# Patient Record
Sex: FEMALE | Race: WHITE | Employment: OTHER | URBAN - METROPOLITAN AREA
[De-identification: names, ages, dates, MRNs, and addresses within clinical notes are randomized per-mention and may not be internally consistent; named-entity substitution may affect disease eponyms.]

---

## 2017-02-13 ENCOUNTER — TELEPHONE (OUTPATIENT)
Dept: INTERNAL MEDICINE CLINIC | Facility: CLINIC | Age: 77
End: 2017-02-13

## 2017-02-15 NOTE — TELEPHONE ENCOUNTER
Pharmacy called in to follow up on refill request.  Pharmacy states pt is completely out of medication.

## 2017-02-17 RX ORDER — SIMVASTATIN 20 MG
TABLET ORAL
Qty: 90 TABLET | Refills: 0 | Status: SHIPPED | OUTPATIENT
Start: 2017-02-17 | End: 2017-05-25

## 2017-02-17 NOTE — TELEPHONE ENCOUNTER
Cholesterol Medications  Protocol Criteria:  · Appointment scheduled in the past 12 months or in the next 3 months  · ALT & LDL on file in the past 12 months  · ALT result < 80  · LDL result <130   Recent Visits       Provider Department Primary Dx    6 mo

## 2017-02-21 RX ORDER — LISINOPRIL 20 MG/1
20 TABLET ORAL
Qty: 90 TABLET | Refills: 0 | OUTPATIENT
Start: 2017-02-21

## 2017-02-27 RX ORDER — HYDROCHLOROTHIAZIDE 25 MG/1
25 TABLET ORAL
Qty: 90 TABLET | Refills: 1 | OUTPATIENT
Start: 2017-02-27

## 2017-03-02 NOTE — TELEPHONE ENCOUNTER
Per pt, he made an appt this Saturday 03/04/2017 and would like to know if Dr Nichole Bustos can refill his med, pt is totally out of med.

## 2017-03-04 ENCOUNTER — OFFICE VISIT (OUTPATIENT)
Dept: INTERNAL MEDICINE CLINIC | Facility: CLINIC | Age: 77
End: 2017-03-04

## 2017-03-04 ENCOUNTER — APPOINTMENT (OUTPATIENT)
Dept: LAB | Age: 77
End: 2017-03-04
Attending: INTERNAL MEDICINE
Payer: MEDICARE

## 2017-03-04 VITALS
DIASTOLIC BLOOD PRESSURE: 71 MMHG | SYSTOLIC BLOOD PRESSURE: 103 MMHG | HEART RATE: 76 BPM | HEIGHT: 60 IN | WEIGHT: 166 LBS | BODY MASS INDEX: 32.59 KG/M2

## 2017-03-04 DIAGNOSIS — I10 ESSENTIAL HYPERTENSION, BENIGN: ICD-10-CM

## 2017-03-04 DIAGNOSIS — E78.00 PURE HYPERCHOLESTEROLEMIA: Primary | ICD-10-CM

## 2017-03-04 DIAGNOSIS — E78.00 PURE HYPERCHOLESTEROLEMIA: ICD-10-CM

## 2017-03-04 LAB
ALBUMIN SERPL BCP-MCNC: 3.7 G/DL (ref 3.5–4.8)
ALBUMIN/GLOB SERPL: 1.1 {RATIO} (ref 1–2)
ALP SERPL-CCNC: 77 U/L (ref 32–100)
ALT SERPL-CCNC: 19 U/L (ref 14–54)
ANION GAP SERPL CALC-SCNC: 7 MMOL/L (ref 0–18)
AST SERPL-CCNC: 25 U/L (ref 15–41)
BILIRUB SERPL-MCNC: 0.6 MG/DL (ref 0.3–1.2)
BUN SERPL-MCNC: 20 MG/DL (ref 8–20)
BUN/CREAT SERPL: 20.6 (ref 10–20)
CALCIUM SERPL-MCNC: 9.7 MG/DL (ref 8.5–10.5)
CHLORIDE SERPL-SCNC: 106 MMOL/L (ref 95–110)
CHOLEST SERPL-MCNC: 140 MG/DL (ref 110–200)
CO2 SERPL-SCNC: 30 MMOL/L (ref 22–32)
CREAT SERPL-MCNC: 0.97 MG/DL (ref 0.5–1.5)
GLOBULIN PLAS-MCNC: 3.3 G/DL (ref 2.5–3.7)
GLUCOSE SERPL-MCNC: 90 MG/DL (ref 70–99)
HDLC SERPL-MCNC: 59 MG/DL
LDLC SERPL CALC-MCNC: 67 MG/DL (ref 0–99)
NONHDLC SERPL-MCNC: 81 MG/DL
OSMOLALITY UR CALC.SUM OF ELEC: 298 MOSM/KG (ref 275–295)
POTASSIUM SERPL-SCNC: 3.5 MMOL/L (ref 3.3–5.1)
PROT SERPL-MCNC: 7 G/DL (ref 5.9–8.4)
SODIUM SERPL-SCNC: 143 MMOL/L (ref 136–144)
TRIGL SERPL-MCNC: 70 MG/DL (ref 1–149)

## 2017-03-04 PROCEDURE — 99214 OFFICE O/P EST MOD 30 MIN: CPT | Performed by: INTERNAL MEDICINE

## 2017-03-04 PROCEDURE — 36415 COLL VENOUS BLD VENIPUNCTURE: CPT

## 2017-03-04 PROCEDURE — 80053 COMPREHEN METABOLIC PANEL: CPT

## 2017-03-04 PROCEDURE — G0463 HOSPITAL OUTPT CLINIC VISIT: HCPCS | Performed by: INTERNAL MEDICINE

## 2017-03-04 PROCEDURE — 80061 LIPID PANEL: CPT

## 2017-03-04 NOTE — PROGRESS NOTES
HPI:    Patient ID: Antonio Townsend is a 77year old female. Pt presents to the clinic for a HTN and HLD management evaluation. Denies CP or SOB. Hypertension  This is a chronic problem. The current episode started more than 1 year ago.  The problem i Dementia Mother      Alzheimers   • Cancer Mother 80   • Diabetes Brother    • Heart Disease Brother      CAD   • Hypertension Brother    • Obesity Brother         Smoking Status: Never Smoker                      Alcohol Use: Yes           0.0 oz/week Height: 5' (1.524 m)   Weight: 166 lb (75.297 kg)         Body mass index is 32.42 kg/(m^2).     Diabetes:  No results found for: A1C, HGBA1C, EAG    Lab Results  Component Value Date   CHOLEST 157 07/18/2016   CHOLEST 150 05/01/2015   CHOLEST 194 12/10/2 Stew Triana, 3/4/2017, 10:46 AM.      IRIMMA MD,  personally performed the services described in this documentation. All medical record entries made by the scribe were at my direction and in my presence.   I have reviewed the chart an

## 2017-05-12 ENCOUNTER — OFFICE VISIT (OUTPATIENT)
Dept: INTERNAL MEDICINE CLINIC | Facility: CLINIC | Age: 77
End: 2017-05-12

## 2017-05-12 ENCOUNTER — APPOINTMENT (OUTPATIENT)
Dept: LAB | Age: 77
End: 2017-05-12
Attending: INTERNAL MEDICINE
Payer: MEDICARE

## 2017-05-12 VITALS
HEART RATE: 73 BPM | WEIGHT: 168.31 LBS | SYSTOLIC BLOOD PRESSURE: 124 MMHG | DIASTOLIC BLOOD PRESSURE: 78 MMHG | BODY MASS INDEX: 33 KG/M2

## 2017-05-12 DIAGNOSIS — G25.0 BENIGN ESSENTIAL TREMOR: Primary | ICD-10-CM

## 2017-05-12 DIAGNOSIS — G25.0 BENIGN ESSENTIAL TREMOR: ICD-10-CM

## 2017-05-12 PROCEDURE — 36415 COLL VENOUS BLD VENIPUNCTURE: CPT

## 2017-05-12 PROCEDURE — G0463 HOSPITAL OUTPT CLINIC VISIT: HCPCS | Performed by: INTERNAL MEDICINE

## 2017-05-12 PROCEDURE — 99213 OFFICE O/P EST LOW 20 MIN: CPT | Performed by: INTERNAL MEDICINE

## 2017-05-12 PROCEDURE — 84443 ASSAY THYROID STIM HORMONE: CPT

## 2017-05-12 NOTE — PROGRESS NOTES
HPI:    Patient ID: Lydia Burgess is a 77year old female. HPI Comments: Patient's  particularly has noted fine tremor in her hands minimally. She is having several days for several months. It does not happen all the time.   She does not drop f Tab Take 5 mg by mouth daily. Disp:  Rfl:    DiphenhydrAMINE HCl, Sleep, (SIMPLY SLEEP OR) Take  by mouth. Prn for sleep. Disp:  Rfl:    aspirin 81 MG Oral Chew Tab Chew 81 mg by mouth daily.  Disp:  Rfl:    FLUoxetine HCl (PROZAC) 20 MG Oral Cap Take 20 mg

## 2017-05-23 ENCOUNTER — TELEPHONE (OUTPATIENT)
Dept: INTERNAL MEDICINE CLINIC | Facility: CLINIC | Age: 77
End: 2017-05-23

## 2017-05-25 RX ORDER — LISINOPRIL 20 MG/1
20 TABLET ORAL
Qty: 90 TABLET | Refills: 0 | Status: SHIPPED | OUTPATIENT
Start: 2017-05-25 | End: 2017-08-31

## 2017-05-25 RX ORDER — SIMVASTATIN 20 MG
TABLET ORAL
Qty: 90 TABLET | Refills: 0 | Status: SHIPPED | OUTPATIENT
Start: 2017-05-25 | End: 2017-09-20

## 2017-05-25 RX ORDER — HYDROCHLOROTHIAZIDE 25 MG/1
25 TABLET ORAL
Qty: 90 TABLET | Refills: 0 | Status: SHIPPED | OUTPATIENT
Start: 2017-05-25 | End: 2017-11-21

## 2017-05-25 NOTE — TELEPHONE ENCOUNTER
Pt's spouse is calling.  Pt needs a refill on:      Current outpatient prescriptions:   •  simvastatin 20 MG Oral Tab, TAKE ONE TABLET BY MOUTH NIGHTLY, Disp: 90 tablet, Rfl: 0    •  lisinopril 20 MG Oral Tab, Take 1 tablet (20 mg total) by mouth once daily

## 2017-05-25 NOTE — TELEPHONE ENCOUNTER
Refill protocol criteria met, rx sent.       Hypertensive Medications  Protocol Criteria:  · Appointment scheduled in the past 6 months or in the next 3 months  · BMP or CMP in the past 12 months  · Creatinine result < 2  Recent Visits       Provider Depart

## 2017-06-23 ENCOUNTER — TELEPHONE (OUTPATIENT)
Dept: INTERNAL MEDICINE CLINIC | Facility: CLINIC | Age: 77
End: 2017-06-23

## 2017-06-23 NOTE — TELEPHONE ENCOUNTER
Spouse calling regarding office visit with pt in may. Spouse state that Dr. Bermudez Flatten discuss medication for the tremours that the pt is having. At the time pt declined medication  But now pt is considering getting the medication. .. please advise

## 2017-06-24 RX ORDER — PROPRANOLOL HYDROCHLORIDE 10 MG/1
10 TABLET ORAL DAILY
Qty: 90 TABLET | Refills: 0 | Status: SHIPPED | OUTPATIENT
Start: 2017-06-24 | End: 2017-09-21

## 2017-06-24 NOTE — TELEPHONE ENCOUNTER
Rx sent. Patient was left a general message a new rx was sent. Patient to call office if any further questions.     Can transfer to [phone]

## 2017-07-12 ENCOUNTER — APPOINTMENT (OUTPATIENT)
Dept: LAB | Facility: HOSPITAL | Age: 77
End: 2017-07-12
Attending: UROLOGY
Payer: MEDICARE

## 2017-07-12 ENCOUNTER — OFFICE VISIT (OUTPATIENT)
Dept: SURGERY | Facility: CLINIC | Age: 77
End: 2017-07-12

## 2017-07-12 VITALS
SYSTOLIC BLOOD PRESSURE: 111 MMHG | TEMPERATURE: 98 F | DIASTOLIC BLOOD PRESSURE: 75 MMHG | BODY MASS INDEX: 30.78 KG/M2 | WEIGHT: 163 LBS | HEIGHT: 61 IN | HEART RATE: 63 BPM

## 2017-07-12 DIAGNOSIS — N39.3 SUI (STRESS URINARY INCONTINENCE, FEMALE): ICD-10-CM

## 2017-07-12 DIAGNOSIS — N39.3 SUI (STRESS URINARY INCONTINENCE, FEMALE): Primary | ICD-10-CM

## 2017-07-12 LAB
BILIRUB UR QL: NEGATIVE
COLOR UR: YELLOW
GLUCOSE UR-MCNC: NEGATIVE MG/DL
HYALINE CASTS #/AREA URNS AUTO: 12 /LPF
KETONES UR-MCNC: NEGATIVE MG/DL
NITRITE UR QL STRIP.AUTO: NEGATIVE
PH UR: 5 [PH] (ref 5–8)
PROT UR-MCNC: 30 MG/DL
RBC #/AREA URNS AUTO: 3 /HPF
SP GR UR STRIP: 1.02 (ref 1–1.03)
UROBILINOGEN UR STRIP-ACNC: <2
VIT C UR-MCNC: NEGATIVE MG/DL
WBC #/AREA URNS AUTO: 12 /HPF

## 2017-07-12 PROCEDURE — G0463 HOSPITAL OUTPT CLINIC VISIT: HCPCS | Performed by: UROLOGY

## 2017-07-12 PROCEDURE — 99204 OFFICE O/P NEW MOD 45 MIN: CPT | Performed by: UROLOGY

## 2017-07-12 PROCEDURE — 81001 URINALYSIS AUTO W/SCOPE: CPT

## 2017-07-12 NOTE — PROGRESS NOTES
SUBJECTIVE:  Gibson Marie is a 77year old female who presents for a consultation at the request of, and a copy of this note will be sent to, Dr. Femi Perry, for evaluation of  Stress urinary incontinence.   She states 6 weeks ago had a bad cold with chronic co pleurisy. CARDIOVASCULAR:  Negative for pain or chest discomfort, dizziness or fainting, palpitations, leg swelling, nocturia, or claudication.   GASTROINTESTINAL:  Negative for nausea, vomiting, diarrhea, constipation, heartburn or indigestion, abdominal

## 2017-08-01 ENCOUNTER — TELEPHONE (OUTPATIENT)
Dept: SURGERY | Facility: CLINIC | Age: 77
End: 2017-08-01

## 2017-08-01 NOTE — TELEPHONE ENCOUNTER
Phoned pt and received voice mail. Lmtcb, stating Genevieve Cecily has some information and instructions for her. Clinic call back number provided.

## 2017-08-01 NOTE — TELEPHONE ENCOUNTER
----- Message from Beatrice Silverio MD sent at 7/28/2017 10:00 PM CDT -----  Staff please call the patient's  as the patient has some issues with dementia.   Inform him that her urinalysis from 2 weeks ago did demonstrate abnormal levels of blood and w

## 2017-08-03 ENCOUNTER — APPOINTMENT (OUTPATIENT)
Dept: LAB | Age: 77
End: 2017-08-03
Attending: UROLOGY
Payer: MEDICARE

## 2017-08-03 DIAGNOSIS — R31.29 MICROHEMATURIA: ICD-10-CM

## 2017-08-03 LAB
BILIRUB UR QL: NEGATIVE
COLOR UR: YELLOW
GLUCOSE UR-MCNC: NEGATIVE MG/DL
KETONES UR-MCNC: NEGATIVE MG/DL
NITRITE UR QL STRIP.AUTO: NEGATIVE
PH UR: 7 [PH] (ref 5–8)
PROT UR-MCNC: NEGATIVE MG/DL
RBC #/AREA URNS AUTO: 2 /HPF
SP GR UR STRIP: 1.02 (ref 1–1.03)
UROBILINOGEN UR STRIP-ACNC: <2
VIT C UR-MCNC: NEGATIVE MG/DL
WBC #/AREA URNS AUTO: 6 /HPF

## 2017-08-03 PROCEDURE — 87086 URINE CULTURE/COLONY COUNT: CPT

## 2017-08-03 PROCEDURE — 81001 URINALYSIS AUTO W/SCOPE: CPT

## 2017-08-11 ENCOUNTER — TELEPHONE (OUTPATIENT)
Dept: INTERNAL MEDICINE CLINIC | Facility: CLINIC | Age: 77
End: 2017-08-11

## 2017-08-11 NOTE — TELEPHONE ENCOUNTER
Patient is eligible for Medicare Advantage AWV Physical Exam. Please assist patient in scheduling appointment when patient returns call. Thank you. Left message to call back [phone].

## 2017-08-16 ENCOUNTER — TELEPHONE (OUTPATIENT)
Dept: INTERNAL MEDICINE CLINIC | Facility: CLINIC | Age: 77
End: 2017-08-16

## 2017-08-23 ENCOUNTER — TELEPHONE (OUTPATIENT)
Dept: SURGERY | Facility: CLINIC | Age: 77
End: 2017-08-23

## 2017-08-23 NOTE — TELEPHONE ENCOUNTER
I was able to speak with Ana Rosado whom is [place]  and he was given results per Anson Quiroga.  was also told that his wife should be seen in 3 month to recheck urine and follow up on urinary symptoms.  Patients  stated that he would call gio

## 2017-08-24 ENCOUNTER — OFFICE VISIT (OUTPATIENT)
Dept: INTERNAL MEDICINE CLINIC | Facility: CLINIC | Age: 77
End: 2017-08-24

## 2017-08-24 VITALS
SYSTOLIC BLOOD PRESSURE: 106 MMHG | HEIGHT: 61 IN | HEART RATE: 71 BPM | WEIGHT: 161.19 LBS | DIASTOLIC BLOOD PRESSURE: 73 MMHG | BODY MASS INDEX: 30.43 KG/M2

## 2017-08-24 DIAGNOSIS — I10 ESSENTIAL HYPERTENSION, BENIGN: ICD-10-CM

## 2017-08-24 DIAGNOSIS — Z00.00 ROUTINE GENERAL MEDICAL EXAMINATION AT A HEALTH CARE FACILITY: Primary | ICD-10-CM

## 2017-08-24 DIAGNOSIS — E78.00 PURE HYPERCHOLESTEROLEMIA: ICD-10-CM

## 2017-08-24 DIAGNOSIS — R41.3 MEMORY LOSS: ICD-10-CM

## 2017-08-24 DIAGNOSIS — R26.9 ABNORMAL GAIT: ICD-10-CM

## 2017-08-24 PROCEDURE — 96160 PT-FOCUSED HLTH RISK ASSMT: CPT | Performed by: INTERNAL MEDICINE

## 2017-08-24 PROCEDURE — G0439 PPPS, SUBSEQ VISIT: HCPCS | Performed by: INTERNAL MEDICINE

## 2017-08-24 NOTE — PROGRESS NOTES
HPI:    Patient ID: Jazmín Dimas is a 77year old female. Annual Preventative Exam  [place] Kasper arrives today for annual preventative physical examination. The patient has 0/10 pain. She is taking her medication as prescribed.  The patient is having a new p walking and exertion. She has tried nothing for the symptoms. Review of Systems   Constitutional: Negative for chills and fever. Respiratory: Negative for chest tightness and shortness of breath. Cardiovascular: Negative for chest pain.    Agatha Grady DiphenhydrAMINE HCl, Sleep, (SIMPLY SLEEP OR) Take  by mouth. Prn for sleep. Disp:  Rfl:    aspirin 81 MG Oral Chew Tab Chew 81 mg by mouth daily. Disp:  Rfl:    FLUoxetine HCl (PROZAC) 20 MG Oral Cap Take 20 mg by mouth daily.  Disp:  Rfl:      Allergies Height: 5' 1\" (1.549 m)         Body mass index is 30.46 kg/m².     Cholesterol:       Lab Results  Component Value Date   CHOLEST 140 03/04/2017   CHOLEST 157 07/18/2016   CHOLEST 150 05/01/2015       Lab Results  Component Value Date   HDL 59 03/04/201 Call the office if symptoms worsen. Addendum  September 1, 2017  The patient is a fall risk that is what I referred her to physical therapy. Please see my plan above. Pro Simpson MD      [facility]     In the past six months, have you lost more th Depression Screening (PHQ-2/PHQ-9): Over the LAST 2 WEEKS   Little interest or pleasure in doing things (over the last two weeks)?: Not at all    Feeling down, depressed, or hopeless (over the last two weeks)?: Not at all    PHQ-2 SCORE: 0        Advan \"Ball\": Correct    Recall \"Flag\": Correct    Recall \"Tree\": Correct            Yuriy Perdomo's SCREENING SCHEDULE   Tests on this list are recommended by your physician but may not be covered, or covered at this frequency, by your insurer.  Please ch applicable    Pneumococcal No orders found for this or any previous visit. Update Immunization Activity if applicable    Hepatitis B No orders found for this or any previous visit.  Update Immunization Activity if applicable    Tetanus No orders found for t Ilya Fritz MD,  personally performed the services described in this documentation. All medical record entries made by the scribe were at my direction and in my presence.   I have reviewed the chart and discharge instructions (if applicable) and agree that the re

## 2017-08-29 ENCOUNTER — TELEPHONE (OUTPATIENT)
Dept: INTERNAL MEDICINE CLINIC | Facility: CLINIC | Age: 77
End: 2017-08-29

## 2017-08-29 NOTE — TELEPHONE ENCOUNTER
Pt following up on refill request for lisinopril 20 MG Oral Tab. .. please advise       Current Outpatient Prescriptions:  lisinopril 20 MG Oral Tab Take 1 tablet (20 mg total) by mouth once daily.  Disp: 90 tablet Rfl: 0

## 2017-08-31 RX ORDER — LISINOPRIL 20 MG/1
20 TABLET ORAL
Qty: 90 TABLET | Refills: 0 | Status: SHIPPED | OUTPATIENT
Start: 2017-08-31 | End: 2017-12-30

## 2017-08-31 NOTE — TELEPHONE ENCOUNTER
Requesting Lisinopril refill    Prescription refilled per IM/FM refill protocol, left VM informing pt.     Hypertensive Medications  Protocol Criteria:  · Appointment scheduled in the past 6 months or in the next 3 months  · BMP or CMP in the past 12 months 30 03/04/2017   GLOBULIN 3.3 03/04/2017   AGRATIO 1.2 07/18/2016   ANIONGAP 7 03/04/2017   OSMOCAL 298 (H) 03/04/2017

## 2017-09-06 ENCOUNTER — OFFICE VISIT (OUTPATIENT)
Dept: PHYSICAL THERAPY | Age: 77
End: 2017-09-06
Attending: INTERNAL MEDICINE
Payer: MEDICARE

## 2017-09-06 DIAGNOSIS — R26.9 ABNORMAL GAIT: ICD-10-CM

## 2017-09-06 PROCEDURE — 97162 PT EVAL MOD COMPLEX 30 MIN: CPT | Performed by: PHYSICAL THERAPIST

## 2017-09-06 NOTE — PROGRESS NOTES
LOWER EXTREMITY EVALUATION:   Referring Physician: Dr. Rashmi Connelly  Diagnosis: Abnormal gait (R26.9)  Date of Onset: 8/24/17 Date of Service: 9/6/2017     PATIENT Roxanne Núñez is a 77year old y/o female who presents to therapy today with complaints treatment, plan of care, mack balance test, test results, increasing safety awareness, and improving gait pattern. Patient was instructed in and issued HEP for: NOEMI over a fulcrum, posture correction in sitting.     Charges: PT Eval x1      Total Timed care.    X___________________________________________________ Date____________________    Certification From: 0/3/8903  To:12/5/2017

## 2017-09-08 ENCOUNTER — OFFICE VISIT (OUTPATIENT)
Dept: PHYSICAL THERAPY | Age: 77
End: 2017-09-08
Attending: INTERNAL MEDICINE
Payer: MEDICARE

## 2017-09-08 DIAGNOSIS — R26.9 ABNORMAL GAIT: ICD-10-CM

## 2017-09-08 PROCEDURE — 97110 THERAPEUTIC EXERCISES: CPT | Performed by: PHYSICAL THERAPIST

## 2017-09-08 NOTE — PROGRESS NOTES
Date: 9/8/2017     Dx: Abnormal gait (R26.9)         Authorized # of Visits:  8       Referring MD: Ilya Babcock MD visit: none scheduled    Medication Changes since last visit?: No    Subjective: Patient has no new complaints.   She is ambulatory without an

## 2017-09-13 ENCOUNTER — OFFICE VISIT (OUTPATIENT)
Dept: PHYSICAL THERAPY | Age: 77
End: 2017-09-13
Attending: INTERNAL MEDICINE
Payer: MEDICARE

## 2017-09-13 DIAGNOSIS — R26.9 ABNORMAL GAIT: ICD-10-CM

## 2017-09-13 PROCEDURE — 97110 THERAPEUTIC EXERCISES: CPT | Performed by: PHYSICAL THERAPIST

## 2017-09-13 NOTE — PROGRESS NOTES
Date: 9/13/2017     Dx:  Abnormal gait (R26.9)         Authorized # of Visits:  8       Referring MD: Ezekiel Babcock MD visit: none scheduled    Medication Changes since last visit?: No    Subjective: Patient is ambulating with a longer step length and slower g consistently have good posture to promote her symptomfree condition. • Therapy Goals               Plan:   Continue with strengthening, stability, ROM, balance, proprioception, stretching, and gait training. Charges:  TherEx x 3       Total Ti

## 2017-09-15 ENCOUNTER — OFFICE VISIT (OUTPATIENT)
Dept: PHYSICAL THERAPY | Age: 77
End: 2017-09-15
Attending: INTERNAL MEDICINE
Payer: MEDICARE

## 2017-09-15 DIAGNOSIS — R26.9 ABNORMAL GAIT: ICD-10-CM

## 2017-09-15 PROCEDURE — 97110 THERAPEUTIC EXERCISES: CPT | Performed by: PHYSICAL THERAPIST

## 2017-09-15 NOTE — PROGRESS NOTES
Date: 9/15/2017     Dx:  Abnormal gait (R26.9)         Authorized # of Visits:  8       Referring MD: Odalis Babcock MD visit: none scheduled    Medication Changes since last visit?: No    Subjective: Patient is ambulating with a longer step length and slower g after her exercises. She required moderate cueing to improve her step length, widen CLARI and to promote heel-toe and push-off pattern. Goals:   Goals     • Therapy Goals            1.  Patient to consistently perform her HEP and it's progression to

## 2017-09-20 ENCOUNTER — TELEPHONE (OUTPATIENT)
Dept: INTERNAL MEDICINE CLINIC | Facility: CLINIC | Age: 77
End: 2017-09-20

## 2017-09-20 ENCOUNTER — OFFICE VISIT (OUTPATIENT)
Dept: PHYSICAL THERAPY | Age: 77
End: 2017-09-20
Attending: INTERNAL MEDICINE
Payer: MEDICARE

## 2017-09-20 DIAGNOSIS — R26.9 ABNORMAL GAIT: ICD-10-CM

## 2017-09-20 PROCEDURE — 97110 THERAPEUTIC EXERCISES: CPT | Performed by: PHYSICAL THERAPIST

## 2017-09-20 RX ORDER — SIMVASTATIN 20 MG
TABLET ORAL
Qty: 90 TABLET | Refills: 0 | Status: SHIPPED | OUTPATIENT
Start: 2017-09-20 | End: 2017-12-21

## 2017-09-20 NOTE — PROGRESS NOTES
Date: 9/20/2017     Dx: Abnormal gait (R26.9)         Authorized # of Visits:  8       Referring MD: Jessenia Birmingham  Next MD visit: none scheduled    Medication Changes since last visit?: No    Subjective: Patient is feeling good without any complains of LOB.   She i Squat 6-5b x 20 reps ea    Shuttle: (B) Calf raises 6b 20+15 reps    (B) Calf stretch with inclined step L2 3 x 30 secs ea              Assessment:   Patient was dizzy after doing her leg press exercise and upon sitting up.  She remained dizzy for 10 mins a

## 2017-09-20 NOTE — TELEPHONE ENCOUNTER
Cholesterol Medications  Protocol Criteria: lov 8/24/17.  rx refilled per protocol  · Appointment scheduled in the past 12 months or in the next 3 months  · ALT & LDL on file in the past 12 months  · ALT result < 80  · LDL result <130   Recent Outpatient Vi

## 2017-09-21 ENCOUNTER — TELEPHONE (OUTPATIENT)
Dept: INTERNAL MEDICINE CLINIC | Facility: CLINIC | Age: 77
End: 2017-09-21

## 2017-09-21 RX ORDER — PROPRANOLOL HYDROCHLORIDE 10 MG/1
10 TABLET ORAL DAILY
Qty: 90 TABLET | Refills: 0 | Status: SHIPPED | OUTPATIENT
Start: 2017-09-21 | End: 2017-12-21

## 2017-09-21 NOTE — TELEPHONE ENCOUNTER
Signed Prescriptions Disp Refills    Propranolol HCl 10 MG Oral Tab 90 tablet 0      Sig: Take 1 tablet (10 mg total) by mouth daily. Authorizing Provider: Elmer Richards        Ordering User: Kaylin Wilcox           Refill approved per protocol. GFRNAA 56 (L) 03/04/2017   GFRAA >60 03/04/2017   CA 9.7 03/04/2017   ALKPHOS 73 07/18/2016   AST 25 03/04/2017   ALT 19 03/04/2017   BILT 0.6 03/04/2017   TP 7.0 03/04/2017   ALB 3.7 03/04/2017    03/04/2017   K 3.5 03/04/2017    03/04/2017

## 2017-09-22 ENCOUNTER — OFFICE VISIT (OUTPATIENT)
Dept: PHYSICAL THERAPY | Age: 77
End: 2017-09-22
Attending: INTERNAL MEDICINE
Payer: MEDICARE

## 2017-09-22 DIAGNOSIS — R26.9 ABNORMAL GAIT: ICD-10-CM

## 2017-09-22 PROCEDURE — 97110 THERAPEUTIC EXERCISES: CPT | Performed by: PHYSICAL THERAPIST

## 2017-09-22 NOTE — PROGRESS NOTES
Date: 9/22/2017     Dx: Abnormal gait (R26.9)         Authorized # of Visits:  8       Referring MD: Bard Eduardo Babcock MD visit: none scheduled    Medication Changes since last visit?: No    Subjective: Patient is feeling better today compared to last visit.   She ea    Seated: (R/L) hamstring curl greenTB 10 reps x 10 cts ea    Standing: (R/L) hip flexion redTB x 20 reps    Hydrant (R/L) 2 lb ball 4 x 15 cts ea    Monster walk redTB fwd/bkwd 3 laps x 15 feet    (R/L) SLStance opposite with CGA x 10 reps Recumbent b Therapy Goals               Plan:   Continue with strengthening, stability, ROM, balance, proprioception, stretching, and gait training. Charges:  TherEx x 3       Total Timed Treatment: 47 mins Total Treatment Time: 49 mins

## 2017-09-26 ENCOUNTER — APPOINTMENT (OUTPATIENT)
Dept: PHYSICAL THERAPY | Age: 77
End: 2017-09-26
Attending: INTERNAL MEDICINE
Payer: MEDICARE

## 2017-09-26 ENCOUNTER — TELEPHONE (OUTPATIENT)
Dept: PHYSICAL THERAPY | Age: 77
End: 2017-09-26

## 2017-09-28 ENCOUNTER — OFFICE VISIT (OUTPATIENT)
Dept: PHYSICAL THERAPY | Age: 77
End: 2017-09-28
Attending: INTERNAL MEDICINE
Payer: MEDICARE

## 2017-09-28 PROCEDURE — 97110 THERAPEUTIC EXERCISES: CPT | Performed by: PHYSICAL THERAPIST

## 2017-09-28 NOTE — PROGRESS NOTES
Date: 9/22/2017     Dx: Abnormal gait (R26.9)         Authorized # of Visits:  12       Referring MD: No ref. provider found  Next MD visit: none scheduled    Medication Changes since last visit?: No    Subjective: Patient reports that she is feeling good. reps ea    Shuttle: (B) Calf raises 6b 20+15 reps    (B) Calf stretch with inclined step L2 3 x 30 secs ea   Recumbent bike L3 x 6 mins    Shuttle: (B) Squat 7b 2 x 20 reps    Shuttle: (R/L) Squat 6b x 20+10 reps ea    Shuttle: (B) Calf raises 6b 20+15 rep

## 2017-09-29 ENCOUNTER — APPOINTMENT (OUTPATIENT)
Dept: PHYSICAL THERAPY | Age: 77
End: 2017-09-29
Attending: INTERNAL MEDICINE
Payer: MEDICARE

## 2017-10-03 ENCOUNTER — TELEPHONE (OUTPATIENT)
Dept: PHYSICAL THERAPY | Age: 77
End: 2017-10-03

## 2017-10-03 ENCOUNTER — APPOINTMENT (OUTPATIENT)
Dept: PHYSICAL THERAPY | Age: 77
End: 2017-10-03
Attending: INTERNAL MEDICINE
Payer: MEDICARE

## 2017-10-05 ENCOUNTER — OFFICE VISIT (OUTPATIENT)
Dept: PHYSICAL THERAPY | Age: 77
End: 2017-10-05
Attending: INTERNAL MEDICINE
Payer: MEDICARE

## 2017-10-05 NOTE — PROGRESS NOTES
Date: 10/5/2017     Dx:  Abnormal gait (R26.9)         Authorized # of Visits:  12       Referring MD: Sourav Babcock MD visit: none scheduled    Medication Changes since last visit?: No    Subjective: Patient and  came in today for her physical therapy Therapy Goals           Plan: Patient was instructed to contact MD and to get okay or clearance to continue with physical therapy due to fall/hit on head.

## 2017-10-06 ENCOUNTER — OFFICE VISIT (OUTPATIENT)
Dept: INTERNAL MEDICINE CLINIC | Facility: CLINIC | Age: 77
End: 2017-10-06

## 2017-10-06 VITALS
DIASTOLIC BLOOD PRESSURE: 68 MMHG | HEART RATE: 80 BPM | SYSTOLIC BLOOD PRESSURE: 97 MMHG | BODY MASS INDEX: 30 KG/M2 | WEIGHT: 157.69 LBS | TEMPERATURE: 98 F

## 2017-10-06 DIAGNOSIS — Z23 IMMUNIZATION DUE: ICD-10-CM

## 2017-10-06 DIAGNOSIS — S09.90XA HEAD TRAUMA, INITIAL ENCOUNTER: Primary | ICD-10-CM
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
## 2017-10-06 DIAGNOSIS — I10 ESSENTIAL HYPERTENSION, BENIGN: ICD-10-CM
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
## 2017-10-06 PROCEDURE — 99214 OFFICE O/P EST MOD 30 MIN: CPT | Performed by: INTERNAL MEDICINE

## 2017-10-06 PROCEDURE — 90653 IIV ADJUVANT VACCINE IM: CPT | Performed by: INTERNAL MEDICINE

## 2017-10-06 PROCEDURE — G0008 ADMIN INFLUENZA VIRUS VAC: HCPCS | Performed by: INTERNAL MEDICINE

## 2017-10-06 PROCEDURE — G0463 HOSPITAL OUTPT CLINIC VISIT: HCPCS | Performed by: INTERNAL MEDICINE

## 2017-10-06 NOTE — PROGRESS NOTES
HPI:    Patient ID: Otf Souza is a 77year old female. History provided by pt and . Fall   The accident occurred 5 to 7 days ago (10/2/17, night). The fall occurred while walking. She fell from a height of 6 to 10 ft.  Impact surface: entert Unspecified essential hypertension       Past Surgical History:  No date: FOOT SURGERY  No date:       Comment: x 3  No date: TONSILLECTOMY   Family History   Problem Relation Age of Onset   • Heart Disease Father      CAD   • Dementia Mother      Zoraida Sheridan gallop and no friction rub. No murmur heard. Pulmonary/Chest: Effort normal and breath sounds normal. No respiratory distress. She has no wheezes. She has no rales. She exhibits no tenderness. Musculoskeletal: She exhibits no edema.    Skin: Skin is d direction and in my presence. I have reviewed the chart and discharge instructions (if applicable) and agree that the record reflects my personal performance and is accurate and complete.   Imelda Cramer MD, 10/6/2017, 4:58 PM

## 2017-10-10 ENCOUNTER — TELEPHONE (OUTPATIENT)
Dept: PHYSICAL THERAPY | Age: 77
End: 2017-10-10

## 2017-10-10 ENCOUNTER — APPOINTMENT (OUTPATIENT)
Dept: PHYSICAL THERAPY | Age: 77
End: 2017-10-10
Attending: INTERNAL MEDICINE
Payer: MEDICARE

## 2017-10-12 ENCOUNTER — OFFICE VISIT (OUTPATIENT)
Dept: PHYSICAL THERAPY | Age: 77
End: 2017-10-12
Attending: INTERNAL MEDICINE
Payer: MEDICARE

## 2017-10-12 PROCEDURE — 97110 THERAPEUTIC EXERCISES: CPT | Performed by: PHYSICAL THERAPIST

## 2017-10-12 NOTE — PROGRESS NOTES
Date: 10/12/2017     Dx: Abnormal gait (R26.9)         Authorized # of Visits:  12       Referring MD: Kamar Doll  Next MD visit: none scheduled    Medication Changes since last visit?: No    Subjective: Patient reports that she is feeling good.   No weakness or ea    Shuttle: (B) Calf raises 6b 20+15 reps    (B) Calf stretch with inclined step L2 3 x 30 secs ea   Recumbent bike L3 x 6 mins    Shuttle: (B) Squat 7b 2 x 20 reps    Shuttle: (R/L) Squat 6b x 20+10 reps ea    Shuttle: (B) Calf raises 6b 20+15 reps cooking) activities without discomfort  4. Patient to consistently have good posture to promote her symptomfree condition.        • Therapy Goals               Plan:   Continue with strengthening, stability, ROM, balance, proprioception, stretching, and gai

## 2017-10-17 ENCOUNTER — OFFICE VISIT (OUTPATIENT)
Dept: PHYSICAL THERAPY | Age: 77
End: 2017-10-17
Attending: INTERNAL MEDICINE
Payer: MEDICARE

## 2017-10-17 PROCEDURE — 97110 THERAPEUTIC EXERCISES: CPT | Performed by: PHYSICAL THERAPIST

## 2017-10-17 NOTE — PROGRESS NOTES
Date: 10/12/2017     Dx: Abnormal gait (R26.9)         Authorized # of Visits:  12       Referring MD: Audrey Petit  Next MD visit: none scheduled    Medication Changes since last visit?: No    Subjective: Patient reports that she is feeling good.   No weakness or ea    Shuttle: (B) Calf raises 6b 20+15 reps    (B) Calf stretch with inclined step L2 3 x 30 secs ea   Recumbent bike L3 x 6 mins    Shuttle: (B) Squat 7b 2 x 20 reps    Shuttle: (R/L) Squat 6b x 20+10 reps ea    Shuttle: (B) Calf raises 6b 20+15 reps end of her session. Goals:   Goals     • Therapy Goals            1. Patient to consistently perform her HEP and it's progression to maintain her improved condition.    2. Patient to have reduced, centralized, and abolished symptoms in the low back to e

## 2017-10-26 ENCOUNTER — OFFICE VISIT (OUTPATIENT)
Dept: PHYSICAL THERAPY | Age: 77
End: 2017-10-26
Attending: INTERNAL MEDICINE
Payer: MEDICARE

## 2017-10-26 PROCEDURE — 97110 THERAPEUTIC EXERCISES: CPT | Performed by: PHYSICAL THERAPIST

## 2017-10-26 NOTE — PROGRESS NOTES
Date: 10/12/2017     Dx: Abnormal gait (R26.9)         Authorized # of Visits:  12       Referring MD: Dharmesh Babcock MD visit: none scheduled    Medication Changes since last visit?: No    Subjective: Patient reports that she is feeling good.   No weakness or ea    Shuttle: (B) Calf raises 6b 20+15 reps    (B) Calf stretch with inclined step L2 3 x 30 secs ea   Recumbent bike L3 x 6 mins    Shuttle: (B) Squat 7b 2 x 20 reps    Shuttle: (R/L) Squat 6b x 20+10 reps ea    Shuttle: (B) Calf raises 6b 20+15 reps 10 cts ea    (R/L) LE SLStance on bluefoam with opposite foot foamroll tap x 10 reps ea    Hydrant: (R/L) 4 lb ball 3 x 15 cts ea         Assessment:   Patient requires moderate cueing to keep her feet apart and maintain wide CLARI.  Patient still needs CGA d

## 2017-10-31 ENCOUNTER — OFFICE VISIT (OUTPATIENT)
Dept: PHYSICAL THERAPY | Age: 77
End: 2017-10-31
Attending: INTERNAL MEDICINE
Payer: MEDICARE

## 2017-10-31 PROCEDURE — 97110 THERAPEUTIC EXERCISES: CPT | Performed by: PHYSICAL THERAPIST

## 2017-10-31 NOTE — PROGRESS NOTES
Date: 10/12/2017     Dx: Abnormal gait (R26.9)         Authorized # of Visits:  12       Referring MD: Timothy Babcock MD visit: none scheduled    Medication Changes since last visit?: No    Subjective: Patient reports that she is feeling good.   No weakness or ea    Shuttle: (B) Calf raises 6b 20+15 reps    (B) Calf stretch with inclined step L2 3 x 30 secs ea   Recumbent bike L3 x 6 mins    Shuttle: (B) Squat 7b 2 x 20 reps    Shuttle: (R/L) Squat 6b x 20+10 reps ea    Shuttle: (B) Calf raises 6b 20+15 reps (R/L) on bluefoam 5 reps x 10 cts ea    (R/L) LE SLStance on bluefoam with opposite foot foamroll tap x 10 reps ea    Hydrant: (R/L) 4 lb ball 3 x 15 cts ea Recumbent L4 x 6 mins      Shuttle: (R/L) Squat 5b 2 sets x 20 reps ea    Shuttle: (B) Calf raises

## 2017-11-09 ENCOUNTER — OFFICE VISIT (OUTPATIENT)
Dept: PHYSICAL THERAPY | Age: 77
End: 2017-11-09
Attending: INTERNAL MEDICINE
Payer: MEDICARE

## 2017-11-09 PROCEDURE — 97110 THERAPEUTIC EXERCISES: CPT | Performed by: PHYSICAL THERAPIST

## 2017-11-09 NOTE — PROGRESS NOTES
Date: 11/9//2017     Dx:  Abnormal gait (R26.9)         Authorized # of Visits:  12       Referring MD: Cici Babcock MD visit: none scheduled    Medication Changes since last visit?: No    Subjective: Patient states that she is feeling good and she has been d 20 reps    Shuttle: (R/L) Squat 6-5b x 20 reps ea    Shuttle: (B) Calf raises 6b 20+15 reps    (B) Calf stretch with inclined step L2 3 x 30 secs ea   Recumbent bike L3 x 6 mins    Shuttle: (B) Squat 7b 2 x 20 reps    Shuttle: (R/L) Squat 6b x 20+10 reps e redTB abd resist (R/L) LE lead 10 reps ea onto 4 inch step    SLStance (R/L) on bluefoam 5 reps x 10 cts ea    (R/L) LE SLStance on bluefoam with opposite foot foamroll tap x 10 reps ea    Hydrant: (R/L) 4 lb ball 3 x 15 cts ea Recumbent L4 x 6 mins      S to have reduced, centralized, and abolished symptoms in the low back to enable easier ADLs, functional, work, and recreational activities.    3. Patient to have WNL of lumbar mobility in all directions to facilitate a return to all (crafts, reading, cooking

## 2017-11-20 ENCOUNTER — TELEPHONE (OUTPATIENT)
Dept: INTERNAL MEDICINE CLINIC | Facility: CLINIC | Age: 77
End: 2017-11-20

## 2017-11-20 NOTE — TELEPHONE ENCOUNTER
[facility] calling to check status of refill submitted via fax to  for medication below and states Pt almost out of medication.      Current Outpatient Prescriptions:   •  hydrochlorothiazide 25 MG Oral Tab, Take 1 tablet (25 mg total) by mouth o

## 2017-11-21 ENCOUNTER — OFFICE VISIT (OUTPATIENT)
Dept: INTERNAL MEDICINE CLINIC | Facility: CLINIC | Age: 77
End: 2017-11-21

## 2017-11-21 VITALS
WEIGHT: 159.63 LBS | BODY MASS INDEX: 30 KG/M2 | SYSTOLIC BLOOD PRESSURE: 120 MMHG | HEART RATE: 75 BPM | DIASTOLIC BLOOD PRESSURE: 75 MMHG

## 2017-11-21 DIAGNOSIS — H11.31 SUBCONJUNCTIVAL HEMORRHAGE OF RIGHT EYE: Primary | ICD-10-CM

## 2017-11-21 PROCEDURE — G0463 HOSPITAL OUTPT CLINIC VISIT: HCPCS | Performed by: INTERNAL MEDICINE

## 2017-11-21 PROCEDURE — 99213 OFFICE O/P EST LOW 20 MIN: CPT | Performed by: INTERNAL MEDICINE

## 2017-11-21 RX ORDER — HYDROCHLOROTHIAZIDE 25 MG/1
25 TABLET ORAL
Qty: 90 TABLET | Refills: 0 | Status: SHIPPED | OUTPATIENT
Start: 2017-11-21 | End: 2018-02-17

## 2017-11-21 NOTE — PROGRESS NOTES
HPI:    Patient ID: Ravi Nazario is a 69year old female. Pt is here with her . She is wearing glasses today. Eye Problem    The right eye is affected. This is a new problem. The current episode started today. There was no injury mechanism.  The once daily. Disp: 90 tablet Rfl: 0   Propranolol HCl 10 MG Oral Tab Take 1 tablet (10 mg total) by mouth daily.  Disp: 90 tablet Rfl: 0   simvastatin 20 MG Oral Tab TAKE ONE TABLET BY MOUTH NIGHTLY Disp: 90 tablet Rfl: 0   lisinopril 20 MG Oral Tab Take 1 t ordered in this encounter    Imaging & Referrals:  None       Id#[id number]  By signing my name below, Judah Miguel,  attest that this documentation has been prepared under the direction and in the presence of RIMMA Bloom MD.   Electronically Signed: My Meyer

## 2017-12-18 NOTE — TELEPHONE ENCOUNTER
Patient is a 56 y.o. female presenting with cough. The history is provided by the patient. Cough   This is a new problem. Episode onset: 2 weeks ago. The problem occurs every few minutes. The problem has been gradually worsening. The cough is productive of sputum. There has been no fever. Associated symptoms include rhinorrhea, shortness of breath and wheezing. Pertinent negatives include no chest pain, no chills, no sweats, no ear congestion, no ear pain, no headaches, no sore throat and no myalgias. She has tried cough syrup for the symptoms. The treatment provided no relief. She is not a smoker. Her past medical history is significant for asthma. Past Medical History:   Diagnosis Date    Allergic rhinitis 6/14/2013    Asthma     Chronic insomnia 3/28/2017    GERD (gastroesophageal reflux disease)     Headache     Headache(784.0)     Hypercholesteremia 1/23/2012    Non morbid obesity 3/28/2017    Snoring     Vitamin D deficiency 10/20/2015        Past Surgical History:   Procedure Laterality Date    ENDOSCOPY, COLON, DIAGNOSTIC  12/03 & 04/09    HX GYN      Laproscopy, BTL,TVH    HX GYN      hysterectomy         Family History   Problem Relation Age of Onset    Hypertension Father     Kidney Disease Maternal Grandmother     Hypertension Maternal Grandmother     Heart Attack Paternal Grandfather     Hypertension Mother     Cancer Maternal Grandfather      lung        Social History     Social History    Marital status:      Spouse name: N/A    Number of children: N/A    Years of education: N/A     Occupational History    Not on file.      Social History Main Topics    Smoking status: Never Smoker    Smokeless tobacco: Never Used    Alcohol use Yes      Comment: Occasionally    Drug use: No    Sexual activity: Yes     Partners: Male     Birth control/ protection: None     Other Topics Concern    Not on file     Social History Narrative                ALLERGIES: Latex and Rx for Hydrochlorothiazide was called to [facility] and left on voice mail. Aspirin    Review of Systems   Constitutional: Negative for chills and fever. HENT: Positive for congestion and rhinorrhea. Negative for ear pain and sore throat. Respiratory: Positive for cough, shortness of breath and wheezing. Cardiovascular: Negative for chest pain and palpitations. Musculoskeletal: Negative for myalgias. Neurological: Negative for headaches. Vitals:    12/18/17 1237   BP: 139/71   Pulse: 81   Resp: 18   Temp: 98.9 °F (37.2 °C)   SpO2: 98%   Weight: 94.8 kg (209 lb)   Height: 5' 5.5\" (1.664 m)       Physical Exam   Constitutional: She appears well-developed and well-nourished. No distress. HENT:   Right Ear: Tympanic membrane, external ear and ear canal normal.   Left Ear: Tympanic membrane, external ear and ear canal normal.   Nose: Rhinorrhea present. Mouth/Throat: Mucous membranes are normal. Posterior oropharyngeal erythema present. No oropharyngeal exudate, posterior oropharyngeal edema or tonsillar abscesses. Cardiovascular: Normal rate, regular rhythm and normal heart sounds. Pulmonary/Chest: Effort normal and breath sounds normal. No respiratory distress. She has no wheezes. She has no rales. Lymphadenopathy:     She has no cervical adenopathy. Neurological: She is alert. Skin: She is not diaphoretic. Psychiatric: She has a normal mood and affect. Her behavior is normal. Judgment and thought content normal.   Nursing note and vitals reviewed. MDM     Differential Diagnosis; Clinical Impression; Plan:     CLINICAL IMPRESSION:  Acute bronchitis, unspecified organism  (primary encounter diagnosis)    Plan:  1. Pred ck  2. Doxycycline  3. Albuterol prn  Risk of Significant Complications, Morbidity, and/or Mortality:   Presenting problems: Moderate  Management options:   Moderate  Progress:   Patient progress:  Stable      Procedures

## 2017-12-20 NOTE — TELEPHONE ENCOUNTER
calling requesting refill, pt is out of meds. Current Outpatient Prescriptions:  Propranolol HCl 10 MG Oral Tab Take 1 tablet (10 mg total) by mouth daily.  Disp: 90 tablet Rfl: 0

## 2017-12-20 NOTE — TELEPHONE ENCOUNTER
Pharmacy called to f/u states they have been faxing over   Had wrong number  I gave them the correct fax #

## 2017-12-20 NOTE — TELEPHONE ENCOUNTER
Pharmacy called in for pt requesting a refill on medication below as well, please.     Current Outpatient Prescriptions:     •  simvastatin 20 MG Oral Tab, TAKE ONE TABLET BY MOUTH NIGHTLY, Disp: 90 tablet, Rfl: 0

## 2017-12-21 RX ORDER — PROPRANOLOL HYDROCHLORIDE 10 MG/1
10 TABLET ORAL DAILY
Qty: 90 TABLET | Refills: 0 | Status: SHIPPED | OUTPATIENT
Start: 2017-12-21 | End: 2018-03-16

## 2017-12-21 RX ORDER — SIMVASTATIN 20 MG
TABLET ORAL
Qty: 90 TABLET | Refills: 0 | Status: SHIPPED | OUTPATIENT
Start: 2017-12-21 | End: 2018-03-19

## 2017-12-21 NOTE — TELEPHONE ENCOUNTER
Both prescriptions were refilled based on protocol.     Hypertensive Medications  Protocol Criteria:  · Appointment scheduled in the past 6 months or in the next 3 months  · BMP or CMP in the past 12 months  · Creatinine result < 2  Recent Outpatient Visits in [place]    Office Visit            Lab Results  Component Value Date   LDL 67 03/04/2017       Lab Results  Component Value Date   ALT 19 03/04/2017

## 2017-12-26 NOTE — TELEPHONE ENCOUNTER
Pharmacy called in requesting the medication below be refilled. Pharmacy states that they will follow up with the Pt to see how many they have. Please advise.        Current Outpatient Prescriptions:   •  lisinopril 20 MG Oral Tab, Take 1 tablet (20 mg tota

## 2017-12-30 RX ORDER — LISINOPRIL 10 MG/1
10 TABLET ORAL DAILY
Qty: 90 TABLET | Refills: 0 | Status: SHIPPED | OUTPATIENT
Start: 2017-12-30 | End: 2018-03-28

## 2017-12-30 NOTE — TELEPHONE ENCOUNTER
Clarified medication from pharmacy and said that on their record it is still lisinopril 20 mg, advised that will call patient to clarify the dose.   Called patient and spoke with Saul(HIPAA), clarified lisinopril dose, states that Dr Farhad Bruno instructed him t 03/04/2017    03/04/2017   CO2 30 03/04/2017   GLOBULIN 3.3 03/04/2017   AGRATIO 1.2 07/18/2016   ANIONGAP 7 03/04/2017   OSMOCAL 298 (H) 03/04/2017

## 2018-02-18 RX ORDER — HYDROCHLOROTHIAZIDE 25 MG/1
25 TABLET ORAL
Qty: 90 TABLET | Refills: 0 | Status: SHIPPED | OUTPATIENT
Start: 2018-02-18 | End: 2018-05-17

## 2018-03-12 ENCOUNTER — PATIENT OUTREACH (OUTPATIENT)
Dept: INTERNAL MEDICINE CLINIC | Facility: CLINIC | Age: 78
End: 2018-03-12

## 2018-03-12 NOTE — PROGRESS NOTES
Informed spouse patient is eligible for 2018 Medicare Annual Health Assessment visit and offered assistance in scheduling, states he has an appointment this week with Dr Tiffanie Osuna and will schedule the Wellness visit at this office.

## 2018-03-16 ENCOUNTER — TELEPHONE (OUTPATIENT)
Dept: INTERNAL MEDICINE CLINIC | Facility: CLINIC | Age: 78
End: 2018-03-16

## 2018-03-16 NOTE — TELEPHONE ENCOUNTER
Patient's spouse states patient needs refill for     Propranolol HCl 10 MG Oral Tab Take 1 tablet (10 mg total) by mouth daily. Disp: 90 tablet Rfl: 0     Patient will be leaving town soon and needs refills.      Please Advise

## 2018-03-18 RX ORDER — PROPRANOLOL HYDROCHLORIDE 10 MG/1
10 TABLET ORAL DAILY
Qty: 90 TABLET | Refills: 0 | Status: SHIPPED | OUTPATIENT
Start: 2018-03-18 | End: 2018-06-16

## 2018-03-20 RX ORDER — SIMVASTATIN 20 MG
TABLET ORAL
Qty: 90 TABLET | Refills: 0 | Status: SHIPPED | OUTPATIENT
Start: 2018-03-20 | End: 2019-04-09

## 2018-03-28 RX ORDER — LISINOPRIL 10 MG/1
TABLET ORAL
Qty: 90 TABLET | Refills: 1 | Status: SHIPPED | OUTPATIENT
Start: 2018-03-28 | End: 2018-09-30

## 2018-03-28 NOTE — TELEPHONE ENCOUNTER
Hypertensive Medications  Protocol Criteria:  · Appointment scheduled in the past 6 months or in the next 3 months  · BMP or CMP in the past 12 months  · Creatinine result < 2  Recent Outpatient Visits            4 months ago Subconjunctival hemorrhage of

## 2018-05-03 ENCOUNTER — TELEPHONE (OUTPATIENT)
Dept: INTERNAL MEDICINE CLINIC | Facility: CLINIC | Age: 78
End: 2018-05-03

## 2018-05-03 NOTE — TELEPHONE ENCOUNTER
Pt is eligible for Medicare Annual Health Assessment. After multiple attempts to reach patient by phone a letter was sent to patient requesting a call back to discuss.

## 2018-05-18 RX ORDER — HYDROCHLOROTHIAZIDE 25 MG/1
25 TABLET ORAL
Qty: 90 TABLET | Refills: 0 | Status: SHIPPED | OUTPATIENT
Start: 2018-05-18 | End: 2018-08-14

## 2018-06-16 RX ORDER — PROPRANOLOL HYDROCHLORIDE 10 MG/1
10 TABLET ORAL DAILY
Qty: 90 TABLET | Refills: 0 | Status: SHIPPED | OUTPATIENT
Start: 2018-06-16 | End: 2018-09-13

## 2018-06-16 NOTE — TELEPHONE ENCOUNTER
Failed per nursing protocol - please advise on refill request     Hypertensive Medications  Protocol Criteria:  · Appointment scheduled in the past 6 months or in the next 3 months  · BMP or CMP in the past 12 months  · Creatinine result < 2  Recent Outpa

## 2018-06-25 ENCOUNTER — OFFICE VISIT (OUTPATIENT)
Dept: INTERNAL MEDICINE CLINIC | Facility: CLINIC | Age: 78
End: 2018-06-25

## 2018-06-25 VITALS
WEIGHT: 155.63 LBS | BODY MASS INDEX: 29.38 KG/M2 | DIASTOLIC BLOOD PRESSURE: 73 MMHG | HEART RATE: 76 BPM | HEIGHT: 61 IN | TEMPERATURE: 97 F | SYSTOLIC BLOOD PRESSURE: 106 MMHG

## 2018-06-25 DIAGNOSIS — R41.3 MEMORY DEFICIT: ICD-10-CM

## 2018-06-25 DIAGNOSIS — R26.9 GAIT ABNORMALITY: ICD-10-CM

## 2018-06-25 DIAGNOSIS — Z00.00 ROUTINE GENERAL MEDICAL EXAMINATION AT A HEALTH CARE FACILITY: Primary | ICD-10-CM

## 2018-06-25 PROCEDURE — G0439 PPPS, SUBSEQ VISIT: HCPCS | Performed by: INTERNAL MEDICINE

## 2018-06-25 PROCEDURE — 99397 PER PM REEVAL EST PAT 65+ YR: CPT | Performed by: INTERNAL MEDICINE

## 2018-06-25 PROCEDURE — 96160 PT-FOCUSED HLTH RISK ASSMT: CPT | Performed by: INTERNAL MEDICINE

## 2018-06-25 NOTE — PROGRESS NOTES
HPI:    Patient ID: Yonathan Howard is a 69year old female. Pt hx provided by pt and . Hypertension   This is a chronic problem. The current episode started more than 1 year ago. The problem is unchanged. The problem is controlled (106/73).  Per Exam  Gonzales Payne arrives today for annual preventative physical examination. The patient complains of no new problems and has 0/10 pain. Review of Systems   Constitutional: Negative for chills and fever.    Respiratory: Negative for chest tightne daily. Disp:  Rfl:    DiphenhydrAMINE HCl, Sleep, (SIMPLY SLEEP OR) Take  by mouth. Prn for sleep. Disp:  Rfl:    aspirin 81 MG Oral Chew Tab Chew 81 mg by mouth daily. Disp:  Rfl:    FLUoxetine HCl (PROZAC) 20 MG Oral Cap Take 20 mg by mouth daily.  Disp: 07/18/2016   CHOLEST 150 05/01/2015       Lab Results  Component Value Date   HDL 59 03/04/2017   HDL 59 07/18/2016   HDL 62 (H) 05/01/2015       Lab Results  Component Value Date   TRIG 70 03/04/2017   TRIG 78 07/18/2016   TRIG 65 05/01/2015       Lab Res Pap+HPV every 5 yrs age 33-67, age 72 and older at high risk   There are no preventive care reminders to display for this patient.  Update Health Maintenance if applicable    Chlamydia  Annually if high risk No results found for: CHLAMYDIA No flowsheet data previous visit. No flowsheet data found.         .  General Health     In the past six months, have you lost more than 10 pounds without trying?: 2 - No    Has your appetite been poor?: No    Type of Diet: Balanced    How does the patient maintain a good en weeks)?: Not at all    Feeling down, depressed, or hopeless (over the last two weeks)?: Not at all    PHQ-2 SCORE: 0        Advance Directives     Do you have a healthcare power of ?: No    Do you have a living will?: No     Hearing Assessment (Req diagnosis)  Memory deficit  Gait abnormality     (Z00.00) Routine general medical examination at a health care facility  (primary encounter diagnosis)  Plan: Patient arrives today for annual preventative physical examination.  The patient feels general heal

## 2018-07-16 ENCOUNTER — HOSPITAL ENCOUNTER (OUTPATIENT)
Dept: GENERAL RADIOLOGY | Age: 78
Discharge: HOME | End: 2018-07-16
Attending: INTERNAL MEDICINE
Payer: MEDICARE

## 2018-07-16 ENCOUNTER — OFFICE VISIT (OUTPATIENT)
Dept: INTERNAL MEDICINE CLINIC | Facility: CLINIC | Age: 78
End: 2018-07-16

## 2018-07-16 ENCOUNTER — NURSE TRIAGE (OUTPATIENT)
Dept: OTHER | Age: 78
End: 2018-07-16

## 2018-07-16 VITALS
SYSTOLIC BLOOD PRESSURE: 134 MMHG | BODY MASS INDEX: 30 KG/M2 | HEART RATE: 76 BPM | DIASTOLIC BLOOD PRESSURE: 76 MMHG | WEIGHT: 156.63 LBS | TEMPERATURE: 97 F
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
## 2018-07-16 PROCEDURE — G0463 HOSPITAL OUTPT CLINIC VISIT: HCPCS | Performed by: INTERNAL MEDICINE

## 2018-07-16 PROCEDURE — 99214 OFFICE O/P EST MOD 30 MIN: CPT | Performed by: INTERNAL MEDICINE

## 2018-07-16 PROCEDURE — 72170 X-RAY EXAM OF PELVIS: CPT | Performed by: INTERNAL MEDICINE

## 2018-07-16 NOTE — PROGRESS NOTES
HPI:    Patient ID: Lulu Li is a 69year old female. Fall   Incident onset: 3 days ago. She fell from a height of 1 to 2 ft. The pain is present in the head and right lower arm. The pain is mild.  Pertinent negatives include no abdominal pain or f Tablet 12 Hr Take 1 tablet by mouth daily. Disp:  Rfl:    DiphenhydrAMINE HCl, Sleep, (SIMPLY SLEEP OR) Take  by mouth. Prn for sleep. Disp:  Rfl:    aspirin 81 MG Oral Chew Tab Chew 81 mg by mouth daily.  Disp:  Rfl:    FLUoxetine HCl (PROZAC) 20 MG Oral C (COMPLETE MIN 3 VIEWS) (CPT=72190)       ND#[id number]  By signing my name below, I, Izzy Moreno,  attest that this documentation has been prepared under the direction and in the presence of RIMMA Garcia MD.   Electronically Signed: Izzy Moreno, 7/16/20

## 2018-07-16 NOTE — TELEPHONE ENCOUNTER
Action Requested: Summary for Provider     []  Critical Lab, Recommendations Needed  [] Need Additional Advice  []   FYI    []   Need Orders  [] Need Medications Sent to Pharmacy  []  Other     SUMMARY: Received call from patient's  named Emily Jason who

## 2018-08-16 RX ORDER — HYDROCHLOROTHIAZIDE 25 MG/1
TABLET ORAL
Qty: 90 TABLET | Refills: 0 | Status: SHIPPED | OUTPATIENT
Start: 2018-08-16 | End: 2018-11-21

## 2018-08-23 ENCOUNTER — TELEPHONE (OUTPATIENT)
Dept: NEUROLOGY | Facility: CLINIC | Age: 78
End: 2018-08-23

## 2018-08-23 NOTE — TELEPHONE ENCOUNTER
[facility] Online  >As part of our Prior Authorization Reduction program, this [employer]vant[employer] member's plan does not currently require a prior authorization to receive these services  Case # [id number]  Will call Pt. To inform.  L/m advising n

## 2018-08-23 NOTE — PROGRESS NOTES
Ms. Anne Munguia was in the office with her , daughter. Last visit was over 2 years ago. No new medical issues. She denies headache, neck pain, low back pain. No focal motor, sensory symptoms in the arms or legs. She does not drive.     Review of syste

## 2018-08-29 ENCOUNTER — TELEPHONE (OUTPATIENT)
Dept: NEUROLOGY | Facility: CLINIC | Age: 78
End: 2018-08-29

## 2018-08-29 NOTE — TELEPHONE ENCOUNTER
Patient's  Jadiel Cotter was informed of Dr. Shelley Simpler remarks and verbalized understanding to DC the medication.     Jadiel Cotter stated he will discuss further at [facility] on 11/26/2018

## 2018-08-29 NOTE — TELEPHONE ENCOUNTER
Please tell the  to discontinue this medication. Asked him to return to the office at their convenience to investigate other treatment options.

## 2018-08-29 NOTE — TELEPHONE ENCOUNTER
Pts  calling stating that the past 2 days he has given the pt Galantamine Hydrobromide and she has thrown up both days, has not given pt medication today due to possible reaction, please advise

## 2018-09-13 RX ORDER — PROPRANOLOL HYDROCHLORIDE 10 MG/1
TABLET ORAL
Qty: 90 TABLET | Refills: 0 | Status: SHIPPED | OUTPATIENT
Start: 2018-09-13 | End: 2018-12-13

## 2018-10-02 ENCOUNTER — TELEPHONE (OUTPATIENT)
Dept: INTERNAL MEDICINE CLINIC | Facility: CLINIC | Age: 78
End: 2018-10-02

## 2018-10-02 DIAGNOSIS — R26.9 GAIT ABNORMALITY: Primary | ICD-10-CM

## 2018-10-02 RX ORDER — LISINOPRIL 10 MG/1
TABLET ORAL
Qty: 90 TABLET | Refills: 1 | Status: SHIPPED | OUTPATIENT
Start: 2018-10-02 | End: 2019-04-15

## 2018-10-09 ENCOUNTER — IMMUNIZATION (OUTPATIENT)
Dept: INTERNAL MEDICINE CLINIC | Facility: CLINIC | Age: 78
End: 2018-10-09
Payer: COMMERCIAL

## 2018-10-09 PROCEDURE — G0008 ADMIN INFLUENZA VIRUS VAC: HCPCS | Performed by: INTERNAL MEDICINE

## 2018-10-09 PROCEDURE — 90653 IIV ADJUVANT VACCINE IM: CPT | Performed by: INTERNAL MEDICINE

## 2018-10-10 ENCOUNTER — OFFICE VISIT (OUTPATIENT)
Dept: PHYSICAL THERAPY | Age: 78
End: 2018-10-10
Attending: INTERNAL MEDICINE
Payer: MEDICARE

## 2018-10-10 DIAGNOSIS — R26.9 GAIT ABNORMALITY: ICD-10-CM

## 2018-10-10 PROCEDURE — 97162 PT EVAL MOD COMPLEX 30 MIN: CPT | Performed by: PHYSICAL THERAPIST

## 2018-10-10 PROCEDURE — 97110 THERAPEUTIC EXERCISES: CPT | Performed by: PHYSICAL THERAPIST

## 2018-10-10 NOTE — PROGRESS NOTES
LOWER EXTREMITY EVALUATION:   Referring Physician: Dr. Cici Archer  Diagnosis: Gait abnormality (R26.9)   Date of Onset: June 2018 Date of Service: 10/10/2018     PATIENT SUMMARY   Corey Reyes is a 69year old y/o female who presents to therapy today with com 4/5, (L) 4/5  EV: (R) 5/5, (L) 5/5         Special tests: Hathaway balance test: 31/56 (a score of 42/56 or below is considered a \"high risk\" for falls).     Today’s Treatment and Response:  Patient education provided on Hathaway balance test score meaning, plan [phone]    Sincerely,  Electronically signed by therapist: Stefany Hughes PT Cert MDT    [de-identified] certification required: Yes  I certify the need for these services furnished under this plan of treatment and while under my care.     X_____________

## 2018-10-11 ENCOUNTER — HOSPITAL ENCOUNTER (OUTPATIENT)
Dept: CT IMAGING | Facility: HOSPITAL | Age: 78
Discharge: HOME | End: 2018-10-11
Attending: Other
Payer: MEDICARE

## 2018-10-11 DIAGNOSIS — F02.80 LATE ONSET ALZHEIMER'S DISEASE WITHOUT BEHAVIORAL DISTURBANCE (HCC): ICD-10-CM

## 2018-10-11 DIAGNOSIS — G30.1 LATE ONSET ALZHEIMER'S DISEASE WITHOUT BEHAVIORAL DISTURBANCE (HCC): ICD-10-CM

## 2018-10-11 PROCEDURE — 70450 CT HEAD/BRAIN W/O DYE: CPT | Performed by: OTHER

## 2018-10-12 ENCOUNTER — OFFICE VISIT (OUTPATIENT)
Dept: PHYSICAL THERAPY | Age: 78
End: 2018-10-12
Attending: INTERNAL MEDICINE
Payer: MEDICARE

## 2018-10-12 DIAGNOSIS — R26.9 GAIT ABNORMALITY: ICD-10-CM

## 2018-10-12 PROCEDURE — 97110 THERAPEUTIC EXERCISES: CPT | Performed by: PHYSICAL THERAPIST

## 2018-10-12 NOTE — PROGRESS NOTES
Date: 10/12/2018     Dx: Gait abnormality (R26.9)          Authorized # of Visits:  12       Referring MD: Simeon Babcock MD visit: none scheduled    Medication Changes since last visit?: No    Subjective: Patient is very pleasant and accommodating.   She can f

## 2018-10-14 ENCOUNTER — TELEPHONE (OUTPATIENT)
Dept: NEUROLOGY | Facility: CLINIC | Age: 78
End: 2018-10-14

## 2018-10-14 DIAGNOSIS — D32.9 MENINGIOMA (HCC): Primary | ICD-10-CM

## 2018-10-14 NOTE — TELEPHONE ENCOUNTER
Please call the patient tell her that the CT scan of the head reveals most likely a meningioma. This is a benign finding. However, radiologist recommended obtaining an MRI of the brain to make sure that it is just a meningioma. I placed an order.   Tammie

## 2018-10-15 ENCOUNTER — TELEPHONE (OUTPATIENT)
Dept: NEUROLOGY | Facility: CLINIC | Age: 78
End: 2018-10-15

## 2018-10-15 NOTE — TELEPHONE ENCOUNTER
Called patient and spoke with her  Saul(hipaa verified) regarding 's findings of her CT scan. Emilystu Gomez verbalized understanding and will inform his wife. Also, phone number provided to schedule MRI brain.

## 2018-10-15 NOTE — TELEPHONE ENCOUNTER
[facility] Online>As part of our Prior Authorization Reduction program, [employer] no longer requires prior authorization for CT, MRI/MRA and transthoracic echocardiography procedures for Medicare members effective 1/1/2018  Case # [id number]

## 2018-10-17 ENCOUNTER — OFFICE VISIT (OUTPATIENT)
Dept: PHYSICAL THERAPY | Age: 78
End: 2018-10-17
Attending: INTERNAL MEDICINE
Payer: MEDICARE

## 2018-10-17 DIAGNOSIS — R26.9 GAIT ABNORMALITY: ICD-10-CM

## 2018-10-17 PROCEDURE — 97116 GAIT TRAINING THERAPY: CPT | Performed by: PHYSICAL THERAPIST

## 2018-10-17 PROCEDURE — 97110 THERAPEUTIC EXERCISES: CPT | Performed by: PHYSICAL THERAPIST

## 2018-10-17 NOTE — PROGRESS NOTES
Date: 10/17/2018     Dx: Gait abnormality (R26.9)          Authorized # of Visits:  12       Referring MD: Raquel Babcock MD visit: none scheduled    Medication Changes since last visit?: No    Subjective: Patient is very pleasant and accommodating.   She can f progression to maintain their improved condition.   2. Patient to have improved gait ability with a wider CLARI, increased and equal (B) LE step length, a heel-toe gait pattern, and improved posture while standing and sitting to promote safety during ambulati

## 2018-10-19 ENCOUNTER — OFFICE VISIT (OUTPATIENT)
Dept: PHYSICAL THERAPY | Age: 78
End: 2018-10-19
Attending: INTERNAL MEDICINE
Payer: MEDICARE

## 2018-10-19 DIAGNOSIS — R26.9 GAIT ABNORMALITY: ICD-10-CM

## 2018-10-19 PROCEDURE — 97116 GAIT TRAINING THERAPY: CPT | Performed by: PHYSICAL THERAPIST

## 2018-10-19 PROCEDURE — 97110 THERAPEUTIC EXERCISES: CPT | Performed by: PHYSICAL THERAPIST

## 2018-10-19 NOTE — PROGRESS NOTES
Date: 10/19/2018     Dx: Gait abnormality (R26.9)          Authorized # of Visits:  12       Referring MD: Ilya Babcock MD visit: none scheduled    Medication Changes since last visit?: No    Subjective: Patient is very pleasant and accommodating.   She forge (R/L) x 5 reps ea with mod-max cueing    Hydrant (R/L) with 1 kg ball 3 x 10 cts ea with Moderate cueing    Ambulation in department with CGA with moderate cueing to widen CLARI and increase step length x 60 feet         Assessment: Patient needs maximal cue

## 2018-10-22 ENCOUNTER — HOSPITAL ENCOUNTER (OUTPATIENT)
Dept: MRI IMAGING | Age: 78
Discharge: HOME | End: 2018-10-22
Attending: Other
Payer: MEDICARE

## 2018-10-22 DIAGNOSIS — D32.9 MENINGIOMA (HCC): ICD-10-CM

## 2018-10-22 PROCEDURE — A9575 INJ GADOTERATE MEGLUMI 0.1ML: HCPCS | Performed by: OTHER

## 2018-10-22 PROCEDURE — 70553 MRI BRAIN STEM W/O & W/DYE: CPT | Performed by: OTHER

## 2018-10-22 PROCEDURE — 82565 ASSAY OF CREATININE: CPT

## 2018-10-24 ENCOUNTER — OFFICE VISIT (OUTPATIENT)
Dept: PHYSICAL THERAPY | Age: 78
End: 2018-10-24
Attending: INTERNAL MEDICINE
Payer: MEDICARE

## 2018-10-24 DIAGNOSIS — R26.9 GAIT ABNORMALITY: ICD-10-CM

## 2018-10-24 PROCEDURE — 97116 GAIT TRAINING THERAPY: CPT | Performed by: PHYSICAL THERAPIST

## 2018-10-24 PROCEDURE — 97110 THERAPEUTIC EXERCISES: CPT | Performed by: PHYSICAL THERAPIST

## 2018-10-24 NOTE — PROGRESS NOTES
Date: 10/24/2018     Dx: Gait abnormality (R26.9)          Authorized # of Visits:  12       Referring MD: Jose Babcock MD visit: none scheduled    Medication Changes since last visit?: No    Subjective: Patient came in ambulating holding on to her  b mod-max cueing    Hydrant (R/L) with 1 kg ball 3 x 10 cts ea with Moderate cueing    Ambulation in department with CGA with moderate cueing to widen CLARI and increase step length x 60 feet NuStep LE only L4 x 10 mins (>=25 SPM)    Shuttle: (B) Leg press 8b

## 2018-10-26 ENCOUNTER — OFFICE VISIT (OUTPATIENT)
Dept: PHYSICAL THERAPY | Age: 78
End: 2018-10-26
Attending: INTERNAL MEDICINE
Payer: MEDICARE

## 2018-10-26 DIAGNOSIS — R26.9 GAIT ABNORMALITY: ICD-10-CM

## 2018-10-26 PROCEDURE — 97110 THERAPEUTIC EXERCISES: CPT | Performed by: PHYSICAL THERAPIST

## 2018-10-26 NOTE — PROGRESS NOTES
Date: 10/26/2018     Dx: Gait abnormality (R26.9)          Authorized # of Visits:  12       Referring MD: Elaine Ching  Next MD visit: none scheduled    Medication Changes since last visit?: No    Subjective: Patient is ambulating better again today for the 2nd s with mod-max cueing    Hydrant (R/L) with 1 kg ball 3 x 10 cts ea with Moderate cueing    Ambulation in department with CGA with moderate cueing to widen CLARI and increase step length x 60 feet NuStep LE only L4 x 10 mins (>=25 SPM)    Shuttle: (B) Leg pres ambulation and transfers. 3. Patient to be safe with transfers, and gait ability. Plan:   Continue with ROM, stretching, strengthening, dynamic stability exercises, and posture correction. Charges:  TherEx x 2, Gait x 2       Total Timed Natalia

## 2018-10-31 ENCOUNTER — OFFICE VISIT (OUTPATIENT)
Dept: PHYSICAL THERAPY | Age: 78
End: 2018-10-31
Attending: INTERNAL MEDICINE
Payer: MEDICARE

## 2018-10-31 DIAGNOSIS — R26.9 GAIT ABNORMALITY: ICD-10-CM

## 2018-10-31 PROCEDURE — 97116 GAIT TRAINING THERAPY: CPT | Performed by: PHYSICAL THERAPIST

## 2018-10-31 PROCEDURE — 97110 THERAPEUTIC EXERCISES: CPT | Performed by: PHYSICAL THERAPIST

## 2018-10-31 NOTE — PROGRESS NOTES
Date: 10/31/2018     Dx: Gait abnormality (R26.9)          Authorized # of Visits:  12       Referring MD: Ilya Babcock MD visit: none scheduled    Medication Changes since last visit?: No    Subjective: Patient is still able ambulating with a wider CLARI but foam roll tap (R/L) x 5 reps ea with mod-max cueing    Hydrant (R/L) with 1 kg ball 3 x 10 cts ea with Moderate cueing    Ambulation in department with CGA with moderate cueing to widen CLARI and increase step length x 60 feet NuStep LE only L4 x 10 mins (>= for a few seconds. Goals:   Goals     • Therapy Goals     • Therapy Goals      (12 visits)  1. Patient to consistently perform their HEP and it's progression to maintain their improved condition.   2. Patient to have improved gait ability with a wid

## 2018-11-01 ENCOUNTER — TELEPHONE (OUTPATIENT)
Dept: NEUROLOGY | Facility: CLINIC | Age: 78
End: 2018-11-01

## 2018-11-01 NOTE — TELEPHONE ENCOUNTER
Pt calling for MRI of brain results - completed 10/22/18. Left message for patient explaining Dr. Sexton [place] is out of the office 11/1-2/18 and that our office would be in touch as soon as we receive results.

## 2018-11-02 ENCOUNTER — APPOINTMENT (OUTPATIENT)
Dept: PHYSICAL THERAPY | Age: 78
End: 2018-11-02
Attending: INTERNAL MEDICINE
Payer: MEDICARE

## 2018-11-05 NOTE — TELEPHONE ENCOUNTER
Please call the patient tell her she has a small meningioma which is slightly enlarged from previous imaging study.   Please also tell the patient that radiologist raises the spectrum of normal pressure hydrocephalus which may be contributing to her cogniti

## 2018-11-05 NOTE — TELEPHONE ENCOUNTER
Called and spoke with  (hipaa verified). Informed  of 's remarks.  verbalized understanding and will follow up at [facility] on 11/26/2018.

## 2018-11-06 ENCOUNTER — OFFICE VISIT (OUTPATIENT)
Dept: PHYSICAL THERAPY | Age: 78
End: 2018-11-06
Attending: INTERNAL MEDICINE
Payer: MEDICARE

## 2018-11-06 PROCEDURE — 97110 THERAPEUTIC EXERCISES: CPT | Performed by: PHYSICAL THERAPIST

## 2018-11-06 NOTE — PROGRESS NOTES
Date: 10/31/2018     Dx: Gait abnormality (R26.9)          Authorized # of Visits:  12       Referring MD: Simeon Babcock MD visit: none scheduled    Medication Changes since last visit?: No    Subjective: Patient is still able ambulating with a wider CLARI but LE tumble foam roll tap (R/L) x 5 reps ea with mod-max cueing    Hydrant (R/L) with 1 kg ball 3 x 10 cts ea with Moderate cueing    Ambulation in department with CGA with moderate cueing to widen CLARI and increase step length x 60 feet NuStep LE only L4 x 1 7b 2 x 20 reps    Shuttle: (B) Calf raises 7b 2 x 20 reps    Inverted bosu squat balance with therapist assistance 4 x 10-15 cts with moderate cueing     Assessment: Patient needed some re-direction when doing her exercises because she is easily distracted

## 2018-11-09 ENCOUNTER — OFFICE VISIT (OUTPATIENT)
Dept: PHYSICAL THERAPY | Age: 78
End: 2018-11-09
Attending: INTERNAL MEDICINE
Payer: MEDICARE

## 2018-11-09 PROCEDURE — 97110 THERAPEUTIC EXERCISES: CPT | Performed by: PHYSICAL THERAPIST

## 2018-11-09 NOTE — PROGRESS NOTES
Date: 10/31/2018     Dx: Gait abnormality (R26.9)          Authorized # of Visits:  12       Referring MD: Gómez More  Next MD visit: none scheduled    Medication Changes since last visit?: No    Subjective: Patient is still able ambulating with a wider CLARI but LE tumble foam roll tap (R/L) x 5 reps ea with mod-max cueing    Hydrant (R/L) with 1 kg ball 3 x 10 cts ea with Moderate cueing    Ambulation in department with CGA with moderate cueing to widen CLARI and increase step length x 60 feet NuStep LE only L4 x 1 7b 2 x 20 reps    Shuttle: (B) Calf raises 7b 2 x 20 reps    Inverted bosu squat balance with therapist assistance 4 x 10-15 cts with moderate cueing     Date: 11/9/18  Tx#: 9/12      NuStep LE only L6 S4 x 10 mins (SPM @ 25 or above)    Lateral walk with

## 2018-11-14 ENCOUNTER — OFFICE VISIT (OUTPATIENT)
Dept: PHYSICAL THERAPY | Age: 78
End: 2018-11-14
Attending: INTERNAL MEDICINE
Payer: MEDICARE

## 2018-11-14 PROCEDURE — 97110 THERAPEUTIC EXERCISES: CPT | Performed by: PHYSICAL THERAPIST

## 2018-11-14 NOTE — PROGRESS NOTES
Date: 11/14/2018     Dx: Gait abnormality (R26.9)          Authorized # of Visits:  12       Referring MD: Ilya Babcock MD visit: none scheduled    Medication Changes since last visit?: No    Subjective: Patient ambulating with a slight increase in CLARI.   She mod-max cueing    Hydrant (R/L) with 1 kg ball 3 x 10 cts ea with Moderate cueing    Ambulation in department with CGA with moderate cueing to widen CLARI and increase step length x 60 feet NuStep LE only L4 x 10 mins (>=25 SPM)    Shuttle: (B) Leg press 8b x 20 reps    Inverted bosu squat balance with therapist assistance 4 x 10-15 cts with moderate cueing     Date: 11/9/18  Tx#: 9/12 Date: 11/14/18  Tx#: 10/12     NuStep LE only L6 S4 x 10 mins (SPM @ 25 or above)    Lateral walk with redTB with CGA and cue

## 2018-11-16 ENCOUNTER — OFFICE VISIT (OUTPATIENT)
Dept: PHYSICAL THERAPY | Age: 78
End: 2018-11-16
Attending: INTERNAL MEDICINE
Payer: MEDICARE

## 2018-11-16 PROCEDURE — 97110 THERAPEUTIC EXERCISES: CPT | Performed by: PHYSICAL THERAPIST

## 2018-11-16 NOTE — PROGRESS NOTES
Date: 11/16/2018     Dx: Gait abnormality (R26.9)          Authorized # of Visits:  12       Referring MD: Rashmi Babcock MD visit: none scheduled    Medication Changes since last visit?: No    Subjective: Patient has no reports of discomfort or ache.   Patient ball 3 x 10 cts ea with Moderate cueing    Ambulation in department with CGA with moderate cueing to widen CLARI and increase step length x 60 feet NuStep LE only L4 x 10 mins (>=25 SPM)    Shuttle: (B) Leg press 8b 2 x 20 reps    Shuttle: (R/L) Leg press 6b with therapist assistance 4 x 10-15 cts with moderate cueing     Date: 11/9/18  Tx#: 9/12 Date: 11/14/18  Tx#: 10/12 Date: 11/16/18  Tx#: 11/12    NuStep LE only L6 S4 x 10 mins (SPM @ 25 or above)    Lateral walk with redTB with CGA and cueing 3 laps x 15 safety during ambulation and transfers. 3. Patient to be safe with transfers, and gait ability. Plan:   Continue with ROM, stretching, strengthening, dynamic stability exercises, and posture correction. Charges:  TherEx x 4    Total Timed Raymundo

## 2018-11-21 ENCOUNTER — APPOINTMENT (OUTPATIENT)
Dept: PHYSICAL THERAPY | Age: 78
End: 2018-11-21
Attending: INTERNAL MEDICINE
Payer: MEDICARE

## 2018-11-21 ENCOUNTER — TELEPHONE (OUTPATIENT)
Dept: INTERNAL MEDICINE CLINIC | Facility: CLINIC | Age: 78
End: 2018-11-21

## 2018-11-21 NOTE — TELEPHONE ENCOUNTER
Please review; protocol failed.     Hypertensive Medications  Protocol Criteria:  · Appointment scheduled in the past 6 months or in the next 3 months  · BMP or CMP in the past 12 months  · Creatinine result < 2  Recent Outpatient Visits            5 days a

## 2018-11-28 NOTE — TELEPHONE ENCOUNTER
No Protocol on this med.        Hypertensive Medications Protocol [id number] 1:30 PM   CMP or BMP in past 12 months     Requested Prescriptions     Pending Prescriptions Disp Refills   • hydrochlorothiazide 25 MG Oral Tab 90 tablet 0     Sig: Take 1 tablet

## 2018-11-29 ENCOUNTER — APPOINTMENT (OUTPATIENT)
Dept: PHYSICAL THERAPY | Age: 78
End: 2018-11-29
Attending: INTERNAL MEDICINE
Payer: MEDICARE

## 2018-11-29 RX ORDER — HYDROCHLOROTHIAZIDE 25 MG/1
25 TABLET ORAL
Qty: 90 TABLET | Refills: 0 | Status: SHIPPED | OUTPATIENT
Start: 2018-11-29 | End: 2019-02-21

## 2018-12-06 ENCOUNTER — OFFICE VISIT (OUTPATIENT)
Dept: PHYSICAL THERAPY | Age: 78
End: 2018-12-06
Attending: INTERNAL MEDICINE
Payer: MEDICARE

## 2018-12-06 PROCEDURE — 97110 THERAPEUTIC EXERCISES: CPT | Performed by: PHYSICAL THERAPIST

## 2018-12-06 NOTE — PROGRESS NOTES
Date: 12/6/2018     Dx: Gait abnormality (R26.9)          Authorized # of Visits:  12       Referring MD: Audrey Pteit  Next MD visit: none scheduled    Medication Changes since last visit?: No    Subjective: Patient has no reports of discomfort or ache.   Patient foam roll tap (R/L) x 5 reps ea with mod-max cueing    Hydrant (R/L) with 1 kg ball 3 x 10 cts ea with Moderate cueing    Ambulation in department with CGA with moderate cueing to widen CLARI and increase step length x 60 feet NuStep LE only L4 x 10 mins (>= reps    Shuttle: (B) Calf raises 7b 2 x 20 reps    Inverted bosu squat balance with therapist assistance 4 x 10-15 cts with moderate cueing     Date: 11/9/18  Tx#: 9/12 Date: 11/14/18  Tx#: 10/12 Date: 11/16/18  Tx#: 11/12 Date: 12/6/18  Tx#: 12/12   Dennise cueing needed. She also require CGA or HHA to improve safety. She is easily distracted and can loose her balance when not paying attention. She is well motivated to improve on her ambulation and safety.   She also has excellent home support form her husb

## 2018-12-06 NOTE — PROGRESS NOTES
Date: 12/6/2018     Dx: Gait abnormality (R26.9)          Authorized # of Visits:  12       Referring MD: Ezekiel Babcock MD visit: none scheduled    Medication Changes since last visit?: No    Subjective: Patient has no reports of discomfort or ache.   Patient stability exercises, and posture correction.

## 2018-12-12 ENCOUNTER — APPOINTMENT (OUTPATIENT)
Dept: PHYSICAL THERAPY | Age: 78
End: 2018-12-12
Attending: INTERNAL MEDICINE
Payer: MEDICARE

## 2018-12-14 ENCOUNTER — OFFICE VISIT (OUTPATIENT)
Dept: PHYSICAL THERAPY | Age: 78
End: 2018-12-14
Attending: INTERNAL MEDICINE
Payer: MEDICARE

## 2018-12-14 PROCEDURE — 97110 THERAPEUTIC EXERCISES: CPT | Performed by: PHYSICAL THERAPIST

## 2018-12-14 RX ORDER — PROPRANOLOL HYDROCHLORIDE 10 MG/1
TABLET ORAL
Qty: 90 TABLET | Refills: 0 | Status: SHIPPED | OUTPATIENT
Start: 2018-12-14 | End: 2019-03-18

## 2018-12-14 NOTE — PROGRESS NOTES
Date: 12/14/2018     Dx: Gait abnormality (R26.9)          Authorized # of Visits:  12       Referring MD: Melinda Babcock MD visit: none scheduled    Medication Changes since last visit?: No    Subjective: Patient has no reports of discomfort or ache.   Patient foam roll tap (R/L) x 5 reps ea with mod-max cueing    Hydrant (R/L) with 1 kg ball 3 x 10 cts ea with Moderate cueing    Ambulation in department with CGA with moderate cueing to widen CLARI and increase step length x 60 feet NuStep LE only L4 x 10 mins (>= reps    Shuttle: (B) Calf raises 7b 2 x 20 reps    Inverted bosu squat balance with therapist assistance 4 x 10-15 cts with moderate cueing     Date: 11/9/18  Tx#: 9/12 Date: 11/14/18  Tx#: 10/12 Date: 11/16/18  Tx#: 11/12 Date: 12/6/18  Tx#: 12/12   Dennise walk with greenTB abd asst 1 lap x 30 feet    SLS on bluefoam with CGA. Angelika 3 x 10 cts ea            Assessment: Rickey Kate was able to balance with less support during her alternate lunges onto rebounder.   Progressed this exercise with a 4 lb ball with medial

## 2018-12-21 ENCOUNTER — OFFICE VISIT (OUTPATIENT)
Dept: PHYSICAL THERAPY | Age: 78
End: 2018-12-21
Attending: INTERNAL MEDICINE
Payer: MEDICARE

## 2018-12-21 PROCEDURE — 97110 THERAPEUTIC EXERCISES: CPT | Performed by: PHYSICAL THERAPIST

## 2018-12-21 NOTE — PROGRESS NOTES
Date: 12/21/2018     Dx: Gait abnormality (R26.9)          Authorized # of Visits:  12       Referring MD: Abdirahman Babcock MD visit: none scheduled    Medication Changes since last visit?: No    Subjective: Patient has no reports of discomfort or ache.   Patient foam roll tap (R/L) x 5 reps ea with mod-max cueing    Hydrant (R/L) with 1 kg ball 3 x 10 cts ea with Moderate cueing    Ambulation in department with CGA with moderate cueing to widen CLARI and increase step length x 60 feet NuStep LE only L4 x 10 mins (>= reps    Shuttle: (B) Calf raises 7b 2 x 20 reps    Inverted bosu squat balance with therapist assistance 4 x 10-15 cts with moderate cueing     Date: 11/9/18  Tx#: 9/12 Date: 11/14/18  Tx#: 10/12 Date: 11/16/18  Tx#: 11/12 Date: 12/6/18  Tx#: 12/12   Dennise x 3 reps ea    Lateral walk with greenTB abd asst 1 lap x 30 feet    SLS on bluefoam with CGA. Angelika 3 x 10 cts ea   NuStep L8 S5 x 10 mins (SPM @ 30 of above)    Alt lunges onto rebounder x 10 with 4 lb ball     + medial rotate with 4 lb ball x 3 reps ea

## 2018-12-27 ENCOUNTER — OFFICE VISIT (OUTPATIENT)
Dept: PHYSICAL THERAPY | Age: 78
End: 2018-12-27
Attending: INTERNAL MEDICINE
Payer: MEDICARE

## 2018-12-27 PROCEDURE — 97110 THERAPEUTIC EXERCISES: CPT | Performed by: PHYSICAL THERAPIST

## 2018-12-27 NOTE — PROGRESS NOTES
Date: 12/21/2018     Dx: Gait abnormality (R26.9)          Authorized # of Visits:  12       Referring MD: Malka Mcconnell  Next MD visit: none scheduled    Medication Changes since last visit?: No    Subjective: Patient has no reports of discomfort or ache.   Patient foam roll tap (R/L) x 5 reps ea with mod-max cueing    Hydrant (R/L) with 1 kg ball 3 x 10 cts ea with Moderate cueing    Ambulation in department with CGA with moderate cueing to widen CLARI and increase step length x 60 feet NuStep LE only L4 x 10 mins (>= reps    Shuttle: (B) Calf raises 7b 2 x 20 reps    Inverted bosu squat balance with therapist assistance 4 x 10-15 cts with moderate cueing     Date: 11/9/18  Tx#: 9/12 Date: 11/14/18  Tx#: 10/12 Date: 11/16/18  Tx#: 11/12 Date: 12/6/18  Tx#: 12/12   Dennise medial rotate with 4 lb ball x 3 reps ea    Lateral walk with greenTB abd asst 1 lap x 30 feet    SLS on bluefoam with CGA. Angelika 3 x 10 cts ea   NuStep L8 S5 x 10 mins (SPM @ 30 of above)    Alt lunges onto rebounder x 10 with 4 lb ball     + medial rotate

## 2019-01-02 NOTE — PROGRESS NOTES
The pleasure of visiting with Ms. Donny Eric, , daughter. I reviewed the images of the CT, MRI scan with him on the computer.   I discussed at length the clinical possibility of normal pressure hydrocephalus, cognitive decline, urinary incontinence, gait

## 2019-01-03 ENCOUNTER — OFFICE VISIT (OUTPATIENT)
Dept: PHYSICAL THERAPY | Age: 79
End: 2019-01-03
Attending: INTERNAL MEDICINE
Payer: MEDICARE

## 2019-01-03 PROCEDURE — 97110 THERAPEUTIC EXERCISES: CPT | Performed by: PHYSICAL THERAPIST

## 2019-01-03 NOTE — PROGRESS NOTES
Date: 1/3/2019     Dx: Gait abnormality (R26.9)          Authorized # of Visits:  12       Referring MD: Daniel Ravi  Next MD visit: none scheduled    Medication Changes since last visit?: No    Subjective: Patient has no reports of discomfort or ache.   Patient i reps ea with mod-max cueing    Hydrant (R/L) with 1 kg ball 3 x 10 cts ea with Moderate cueing    Ambulation in department with CGA with moderate cueing to widen CLARI and increase step length x 60 feet NuStep LE only L4 x 10 mins (>=25 SPM)    Shuttle: (B) raises 7b 2 x 20 reps    Inverted bosu squat balance with therapist assistance 4 x 10-15 cts with moderate cueing     Date: 11/9/18  Tx#: 9/12 Date: 11/14/18  Tx#: 10/12 Date: 11/16/18  Tx#: 11/12 Date: 12/6/18  Tx#: 12/12   NuStep LE only L6 S4 x 10 mins medial rotate with 4 lb ball x 3 reps ea    Lateral walk with greenTB abd asst 1 lap x 30 feet    SLS on bluefoam with CGA. Angelika 3 x 10 cts ea   NuStep L8 S5 x 10 mins (SPM @ 30 of above)    Alt lunges onto rebounder x 10 with 4 lb ball     + medial rotate stability exercises, and posture correction. Charges:  TherEx x 3    Total Timed Treatment:  48 mins Total Treatment Time: 56 mins

## 2019-01-09 ENCOUNTER — OFFICE VISIT (OUTPATIENT)
Dept: PHYSICAL THERAPY | Age: 79
End: 2019-01-09
Attending: INTERNAL MEDICINE
Payer: MEDICARE

## 2019-01-09 PROCEDURE — 97110 THERAPEUTIC EXERCISES: CPT | Performed by: PHYSICAL THERAPIST

## 2019-01-09 NOTE — PROGRESS NOTES
Date: 1/3/2019     Dx: Gait abnormality (R26.9)          Authorized # of Visits:  12       Referring MD: Jessenia Babcock MD visit: none scheduled    Medication Changes since last visit?: No    Subjective: Patient has no reports of discomfort or ache.   Patient i reps ea with mod-max cueing    Hydrant (R/L) with 1 kg ball 3 x 10 cts ea with Moderate cueing    Ambulation in department with CGA with moderate cueing to widen CLARI and increase step length x 60 feet NuStep LE only L4 x 10 mins (>=25 SPM)    Shuttle: (B) raises 7b 2 x 20 reps    Inverted bosu squat balance with therapist assistance 4 x 10-15 cts with moderate cueing     Date: 11/9/18  Tx#: 9/12 Date: 11/14/18  Tx#: 10/12 Date: 11/16/18  Tx#: 11/12 Date: 12/6/18  Tx#: 12/12   NuStep LE only L6 S4 x 10 mins with 4 lb ball     + medial rotate with 4 lb ball x 3 reps ea    Lateral walk with greenTB abd asst 1 lap x 30 feet    SLS on bluefoam with CGA. Angelika 3 x 10 cts ea   NuStep L8 S5 x 10 mins (SPM @ 30 of above)    Alt lunges onto rebounder x 10 with 4 lb ball have improved gait ability with a wider CLARI, increased and equal (B) LE step length, a heel-toe gait pattern, and improved posture while standing and sitting to promote safety during ambulation and transfers.   3. Patient to be safe with transfers, and gait

## 2019-01-11 ENCOUNTER — OFFICE VISIT (OUTPATIENT)
Dept: PHYSICAL THERAPY | Age: 79
End: 2019-01-11
Attending: INTERNAL MEDICINE
Payer: MEDICARE

## 2019-01-11 PROCEDURE — 97110 THERAPEUTIC EXERCISES: CPT | Performed by: PHYSICAL THERAPIST

## 2019-01-11 NOTE — PROGRESS NOTES
Date: 1/11/2019     Dx: Gait abnormality (R26.9)          Authorized # of Visits:  12       Referring MD: Elaine Ching  Next MD visit: none scheduled    Medication Changes since last visit?: No    Subjective: Patient has no reports of discomfort or ache.  [facility] ([facility]) x 5 reps ea with mod-max cueing    Hydrant (R/L) with 1 kg ball 3 x 10 cts ea with Moderate cueing    Ambulation in department with CGA with moderate cueing to widen CLARI and increase step length x 60 feet NuStep LE only L4 x 10 mins (>= reps    Shuttle: (B) Calf raises 7b 2 x 20 reps    Inverted bosu squat balance with therapist assistance 4 x 10-15 cts with moderate cueing     Date: 11/9/18  Tx#: 9/12 Date: 11/14/18  Tx#: 10/12 Date: 11/16/18  Tx#: 11/12 Date: 12/6/18  Tx#: 12/12   Dennise 2 x 20 reps ea    Alt lunges onto rebounder x 10 with 4 lb ball     + medial rotate with 4 lb ball x 3 reps ea    Lateral walk with greenTB abd asst 1 lap x 30 feet    SLS on bluefoam with CGA. Angelika 3 x 10 cts ea   NuStep L8 S5 x 10 mins (SPM @ 30 of above) exercises today were progressed to a combination of single leg balance with coordination and control exercise. Goals: - IN PROGRESS   Goals     • Therapy Goals     • Therapy Goals      (12 visits)  1.  Patient to consistently perform their HEP and it

## 2019-01-14 ENCOUNTER — TELEPHONE (OUTPATIENT)
Dept: INTERNAL MEDICINE CLINIC | Facility: CLINIC | Age: 79
End: 2019-01-14

## 2019-01-16 ENCOUNTER — OFFICE VISIT (OUTPATIENT)
Dept: PHYSICAL THERAPY | Age: 79
End: 2019-01-16
Attending: INTERNAL MEDICINE
Payer: MEDICARE

## 2019-01-16 PROCEDURE — 97110 THERAPEUTIC EXERCISES: CPT | Performed by: PHYSICAL THERAPIST

## 2019-01-16 NOTE — PROGRESS NOTES
Date: 1/16/2019     Dx: Gait abnormality (R26.9)          Authorized # of Visits:  12       Referring MD: Jeanette Babcock MD visit: none scheduled    Medication Changes since last visit?: No    Subjective: Patient has been walking with a steadier gait as report Step up onto 4 inch step with opposite LE tumble foam roll tap (R/L) x 5 reps ea with mod-max cueing    Hydrant (R/L) with 1 kg ball 3 x 10 cts ea with Moderate cueing    Ambulation in department with CGA with moderate cueing to widen CLARI and increase x 10 reps ea    Shuttle: (R/L) Leg press 7b 2 x 20 reps    Shuttle: (B) Calf raises 7b 2 x 20 reps    Inverted bosu squat balance with therapist assistance 4 x 10-15 cts with moderate cueing     Date: 11/9/18  Tx#: 9/12 Date: 11/14/18  Tx#: 10/12 Date: 11/ above)    Shuttle: (B) leg press 7b 2 x 20 reps    Shuttle: (R/L) 6b 2 x 20 reps ea    Alt lunges onto rebounder x 10 with 4 lb ball     + medial rotate with 4 lb ball x 3 reps ea    Lateral walk with greenTB abd asst 1 lap x 30 feet    SLS on bluefoam wit (R/L) LE lead x 5 reps ea    NOEMI over rail x 10 reps; P, NW stretch mid back          Assessment: Thelma Patricio is able to follow commands for the single leg balance on foam pad with opposite LE 2 cone taps.   She still has difficulty shifting her weight on her l

## 2019-01-18 ENCOUNTER — OFFICE VISIT (OUTPATIENT)
Dept: PHYSICAL THERAPY | Age: 79
End: 2019-01-18
Attending: INTERNAL MEDICINE
Payer: MEDICARE

## 2019-01-18 PROCEDURE — 97110 THERAPEUTIC EXERCISES: CPT | Performed by: PHYSICAL THERAPIST

## 2019-01-18 NOTE — PROGRESS NOTES
Date: 1/18/2019     Dx: Gait abnormality (R26.9)          Authorized # of Visits:  12       Referring MD: Baltazar Babcock MD visit: none scheduled    Medication Changes since last visit?: No    Subjective: Patient has been walking with a steadier gait as report roll tap (R/L) x 5 reps ea with mod-max cueing    Hydrant (R/L) with 1 kg ball 3 x 10 cts ea with Moderate cueing    Ambulation in department with CGA with moderate cueing to widen CLARI and increase step length x 60 feet NuStep LE only L4 x 10 mins (>=25 SP reps    Shuttle: (B) Calf raises 7b 2 x 20 reps    Inverted bosu squat balance with therapist assistance 4 x 10-15 cts with moderate cueing     Date: 11/9/18  Tx#: 9/12 Date: 11/14/18  Tx#: 10/12 Date: 11/16/18  Tx#: 11/12 Date: 12/6/18  Tx#: 12/12   Dennise 2 x 20 reps ea    Alt lunges onto rebounder x 10 with 4 lb ball     + medial rotate with 4 lb ball x 3 reps ea    Lateral walk with greenTB abd asst 1 lap x 30 feet    SLS on bluefoam with CGA. Angelika 3 x 10 cts ea   NuStep L8 S5 x 10 mins (SPM @ 30 of above) x 10 reps; P, NW stretch mid back NuStep L9 S5 x 10 mins (SPM @ 20-25)    Alt step up/over lunge 5+3 reps x 5 cts ea (Min-Mod A)    Straddle walk along length of 4 inch step 3 laps (Moderate verbal cueing needed)    Inverted bosu squat with mod-min asst 2

## 2019-01-23 ENCOUNTER — APPOINTMENT (OUTPATIENT)
Dept: PHYSICAL THERAPY | Age: 79
End: 2019-01-23
Attending: INTERNAL MEDICINE
Payer: MEDICARE

## 2019-01-24 ENCOUNTER — OFFICE VISIT (OUTPATIENT)
Dept: INTERNAL MEDICINE CLINIC | Facility: CLINIC | Age: 79
End: 2019-01-24
Payer: COMMERCIAL

## 2019-01-24 VITALS
WEIGHT: 155 LBS | DIASTOLIC BLOOD PRESSURE: 74 MMHG | SYSTOLIC BLOOD PRESSURE: 106 MMHG | BODY MASS INDEX: 29 KG/M2 | HEART RATE: 81 BPM | TEMPERATURE: 98 F

## 2019-01-24 DIAGNOSIS — E78.00 PURE HYPERCHOLESTEROLEMIA: ICD-10-CM

## 2019-01-24 DIAGNOSIS — R26.9 ABNORMAL GAIT: ICD-10-CM

## 2019-01-24 DIAGNOSIS — I10 ESSENTIAL HYPERTENSION, BENIGN: Primary | ICD-10-CM

## 2019-01-24 PROBLEM — G95.9 CERVICAL MYELOPATHY (HCC): Status: ACTIVE | Noted: 2019-01-24

## 2019-01-24 PROBLEM — N18.30 CKD (CHRONIC KIDNEY DISEASE) STAGE 3, GFR 30-59 ML/MIN (HCC): Chronic | Status: ACTIVE | Noted: 2019-01-24

## 2019-01-24 PROCEDURE — G0463 HOSPITAL OUTPT CLINIC VISIT: HCPCS | Performed by: INTERNAL MEDICINE

## 2019-01-24 PROCEDURE — 99214 OFFICE O/P EST MOD 30 MIN: CPT | Performed by: INTERNAL MEDICINE

## 2019-01-24 NOTE — PROGRESS NOTES
HPI:    Patient ID: Shantell Oliveira is a 69year old female. Patient presents with:  Hyperlipidemia: fup per  pt has not been taking chol med.    Hypertension: fup     HPI    Hyperlipidemia  Chronic. 04/04/2017 Lipid Panel: CHOLEST 140, HDL 59, LDL 6 problems, hallucinations, self-injury and suicidal ideas.      HISTORY:  Past Medical History:   Diagnosis Date   • Bunion    • Depression     medication and therapy   • Esophageal reflux    • Hammer toe    • Lipid screening 12-   • Nose fracture ONLY   PHYSICAL EXAM:   Physical Exam   Constitutional: She is oriented to person, place, and time. She appears well-developed and well-nourished. HENT:   Head: Normocephalic and atraumatic.    Right Ear: External ear normal.   Left Ear: External ear norm Value Date    AST 25 03/04/2017    AST 21 07/18/2016    AST 22 05/01/2015     Lab Results   Component Value Date    ALT 19 03/04/2017    ALT 19 07/18/2016    ALT 19 05/01/2015          ASSESSMENT/PLAN:   Essential hypertension, benign  (primary encounter d instructions (if applicable) and agree that the record reflects my personal performance and is accurate and complete.   Sharron Wrokman MD, 1/24/2019, 4:27 PM

## 2019-01-25 ENCOUNTER — APPOINTMENT (OUTPATIENT)
Dept: PHYSICAL THERAPY | Age: 79
End: 2019-01-25
Attending: INTERNAL MEDICINE
Payer: MEDICARE

## 2019-01-29 ENCOUNTER — TELEPHONE (OUTPATIENT)
Dept: NEUROLOGY | Facility: CLINIC | Age: 79
End: 2019-01-29

## 2019-01-30 ENCOUNTER — APPOINTMENT (OUTPATIENT)
Dept: PHYSICAL THERAPY | Age: 79
End: 2019-01-30
Attending: INTERNAL MEDICINE
Payer: MEDICARE

## 2019-02-01 ENCOUNTER — APPOINTMENT (OUTPATIENT)
Dept: PHYSICAL THERAPY | Age: 79
End: 2019-02-01
Attending: INTERNAL MEDICINE
Payer: MEDICARE

## 2019-02-04 RX ORDER — MEMANTINE HYDROCHLORIDE 5 MG-10 MG
KIT ORAL
Qty: 1 PACKAGE | Refills: 0 | Status: SHIPPED | OUTPATIENT
Start: 2019-02-04 | End: 2019-02-05

## 2019-02-05 ENCOUNTER — PATIENT OUTREACH (OUTPATIENT)
Dept: CASE MANAGEMENT | Age: 79
End: 2019-02-05

## 2019-02-05 RX ORDER — MEMANTINE HYDROCHLORIDE 10 MG/1
10 TABLET ORAL 2 TIMES DAILY
Qty: 180 TABLET | Refills: 3 | Status: SHIPPED | OUTPATIENT
Start: 2019-02-05 | End: 2020-03-16

## 2019-02-05 RX ORDER — MEMANTINE HYDROCHLORIDE 10 MG/1
10 TABLET ORAL 2 TIMES DAILY
Qty: 180 TABLET | Refills: 0 | Status: SHIPPED | OUTPATIENT
Start: 2019-02-05 | End: 2019-02-05

## 2019-02-05 NOTE — TELEPHONE ENCOUNTER
Patient has completed titration pack of Namenda. Per verbal order of - patient to continue on     Namenda 10mg BID.  #180 with 3 refills

## 2019-02-05 NOTE — PROGRESS NOTES
Spouse informed is eligible for 2019 Medicare Annual Health Assessment visit, has OV scheduled for 4/25/19, OK with patient to change visit type to JUDY - [facility]WINDER.

## 2019-02-07 ENCOUNTER — OFFICE VISIT (OUTPATIENT)
Dept: PHYSICAL THERAPY | Age: 79
End: 2019-02-07
Attending: INTERNAL MEDICINE
Payer: MEDICARE

## 2019-02-07 PROCEDURE — 97110 THERAPEUTIC EXERCISES: CPT | Performed by: PHYSICAL THERAPIST

## 2019-02-07 NOTE — PROGRESS NOTES
Date: 2/7/2019     Dx: Gait abnormality (R26.9)          Authorized # of Visits:  12       Referring MD: No ref.  provider found  Next MD visit: none scheduled    Medication Changes since last visit?: No    Subjective: Patient has missed her therapy session indicate distance of feet with maximal cueing (R/L) leg lead x 10 reps ea     Step up onto 4 inch step with opposite LE tumble foam roll tap (R/L) x 5 reps ea with mod-max cueing    Hydrant (R/L) with 1 kg ball 3 x 10 cts ea with Moderate cueing    Ambulat feet    Alt lunges onto rebounder with white tape for target and moderate cueing x 10 reps ea    Shuttle: (R/L) Leg press 7b 2 x 20 reps    Shuttle: (B) Calf raises 7b 2 x 20 reps    Inverted bosu squat balance with therapist assistance 4 x 10-15 cts with Date: 1/11/19  Tx#: 18/24   NuStep L6 S4 x 10 reps (SPM @ 25 or above)    Shuttle: (B) leg press 7b 2 x 20 reps    Shuttle: (R/L) 6b 2 x 20 reps ea    Alt lunges onto rebounder x 10 with 4 lb ball     + medial rotate with 4 lb ball x 3 reps ea    Lateral w reps x 10 cts     (R/L) SLS on bluefoam with opposite foot 2 cone tap x 10 reps ea    Step up over a 4 inch step (R/L) LE lead x 5 reps ea    NOEMI over rail x 10 reps; P, NW stretch mid back NuStep L9 S5 x 10 mins (SPM @ 20-25)    Alt step up/over lunge 5+

## 2019-02-20 ENCOUNTER — OFFICE VISIT (OUTPATIENT)
Dept: PHYSICAL THERAPY | Age: 79
End: 2019-02-20
Attending: INTERNAL MEDICINE
Payer: MEDICARE

## 2019-02-20 PROCEDURE — 97110 THERAPEUTIC EXERCISES: CPT | Performed by: PHYSICAL THERAPIST

## 2019-02-20 NOTE — PROGRESS NOTES
Date: 2/20/2019     Dx: Gait abnormality (R26.9)          Authorized # of Visits:  12       Referring MD: Timothy Babcock MD visit: none scheduled    Medication Changes since last visit?: No    Subjective: Patient fell yesterday in the bathroom trying to put he Step up onto 4 inch step with opposite LE tumble foam roll tap (R/L) x 5 reps ea with mod-max cueing    Hydrant (R/L) with 1 kg ball 3 x 10 cts ea with Moderate cueing    Ambulation in department with CGA with moderate cueing to widen CLARI and increase x 10 reps ea    Shuttle: (R/L) Leg press 7b 2 x 20 reps    Shuttle: (B) Calf raises 7b 2 x 20 reps    Inverted bosu squat balance with therapist assistance 4 x 10-15 cts with moderate cueing     Date: 11/9/18  Tx#: 9/12 Date: 11/14/18  Tx#: 10/12 Date: 11/ above)    Shuttle: (B) leg press 7b 2 x 20 reps    Shuttle: (R/L) 6b 2 x 20 reps ea    Alt lunges onto rebounder x 10 with 4 lb ball     + medial rotate with 4 lb ball x 3 reps ea    Lateral walk with greenTB abd asst 1 lap x 30 feet    SLS on bluefoam wit bluefoam with opposite foot 2 cone tap x 10 reps ea    Step up over a 4 inch step (R/L) LE lead x 5 reps ea    NOEMI over rail x 10 reps; P, NW stretch mid back NuStep L9 S5 x 10 mins (SPM @ 20-25)    Alt step up/over lunge 5+3 reps x 5 cts ea (Min-Mod A) correction. Charges:  TherEx x 3    Total Timed Treatment:  45 mins Total Treatment Time: 48 mins

## 2019-02-21 NOTE — TELEPHONE ENCOUNTER
Current Outpatient Medications:   •  hydrochlorothiazide 25 MG Oral Tab, Take 1 tablet (25 mg total) by mouth once daily. , Disp: 90 tablet, Rfl: 0

## 2019-02-23 RX ORDER — HYDROCHLOROTHIAZIDE 25 MG/1
25 TABLET ORAL
Qty: 90 TABLET | Refills: 0 | Status: SHIPPED | OUTPATIENT
Start: 2019-02-23 | End: 2019-05-21

## 2019-02-23 NOTE — TELEPHONE ENCOUNTER
Please review; protocol failed.     Hypertensive Medications  Protocol Criteria:  · Appointment scheduled in the past 6 months or in the next 3 months  · BMP or CMP in the past 12 months  · Creatinine result < 2  Recent Outpatient Visits            2 days a

## 2019-02-26 ENCOUNTER — TELEPHONE (OUTPATIENT)
Dept: NEUROLOGY | Facility: CLINIC | Age: 79
End: 2019-02-26

## 2019-02-27 ENCOUNTER — OFFICE VISIT (OUTPATIENT)
Dept: PHYSICAL THERAPY | Age: 79
End: 2019-02-27
Attending: INTERNAL MEDICINE
Payer: MEDICARE

## 2019-02-27 PROCEDURE — 97110 THERAPEUTIC EXERCISES: CPT | Performed by: PHYSICAL THERAPIST

## 2019-02-27 NOTE — PROGRESS NOTES
Date: 2/20/2019     Dx: Gait abnormality (R26.9)          Authorized # of Visits:  12       Referring MD: Sourav Flowers  Next MD visit: none scheduled    Medication Changes since last visit?: No    Subjective: Patient fell yesterday in the bathroom trying to put he Step up onto 4 inch step with opposite LE tumble foam roll tap (R/L) x 5 reps ea with mod-max cueing    Hydrant (R/L) with 1 kg ball 3 x 10 cts ea with Moderate cueing    Ambulation in department with CGA with moderate cueing to widen CLARI and increase x 10 reps ea    Shuttle: (R/L) Leg press 7b 2 x 20 reps    Shuttle: (B) Calf raises 7b 2 x 20 reps    Inverted bosu squat balance with therapist assistance 4 x 10-15 cts with moderate cueing     Date: 11/9/18  Tx#: 9/12 Date: 11/14/18  Tx#: 10/12 Date: 11/ above)    Shuttle: (B) leg press 7b 2 x 20 reps    Shuttle: (R/L) 6b 2 x 20 reps ea    Alt lunges onto rebounder x 10 with 4 lb ball     + medial rotate with 4 lb ball x 3 reps ea    Lateral walk with greenTB abd asst 1 lap x 30 feet    SLS on bluefoam wit (R/L) SLS on bluefoam with opposite foot 2 cone tap x 10 reps ea    Step up over a 4 inch step (R/L) LE lead x 5 reps ea    NOEMI over rail x 10 reps; P, NW stretch mid back NuStep L9 S5 x 10 mins (SPM @ 20-25)    Alt step up/over lunge 5+3 reps x 5 cts ability. Current status G Code: Current Mobility: Walking and Moving Around CL: 50-69% impaired, limited, or restricted  Goal status G Code:  Mobility: Walking and Moving Around CK: 40-59% impaired, limited, or restricted    Plan:   Continue with R

## 2019-03-07 ENCOUNTER — OFFICE VISIT (OUTPATIENT)
Dept: PHYSICAL THERAPY | Age: 79
End: 2019-03-07
Attending: INTERNAL MEDICINE
Payer: MEDICARE

## 2019-03-07 PROCEDURE — 97110 THERAPEUTIC EXERCISES: CPT | Performed by: PHYSICAL THERAPIST

## 2019-03-07 NOTE — PROGRESS NOTES
Date: 3/7/2019     Dx: Gait abnormality (R26.9)          Authorized # of Visits:  12 + 12 = 24      Referring MD: Tiffanie Babcock MD visit: none scheduled    Medication Changes since last visit?: No    Subjective: Patient is feeling good and does not have any r white tape to indicate distance of feet with maximal cueing (R/L) leg lead x 10 reps ea     Step up onto 4 inch step with opposite LE tumble foam roll tap (R/L) x 5 reps ea with mod-max cueing    Hydrant (R/L) with 1 kg ball 3 x 10 cts ea with Moderate cue laps x 15 feet    Alt lunges onto rebounder with white tape for target and moderate cueing x 10 reps ea    Shuttle: (R/L) Leg press 7b 2 x 20 reps    Shuttle: (B) Calf raises 7b 2 x 20 reps    Inverted bosu squat balance with therapist assistance 4 x 10-15 1/9/19  Tx#: 17/24 Date: 1/11/19  Tx#: 18/24   NuStep L6 S4 x 10 reps (SPM @ 25 or above)    Shuttle: (B) leg press 7b 2 x 20 reps    Shuttle: (R/L) 6b 2 x 20 reps ea    Alt lunges onto rebounder x 10 with 4 lb ball     + medial rotate with 4 lb ball x 3 r 23/24 Date: 3/7/19  Tx#: 24/24   NuStep L9 S5 x 10 mins (SPM @ 20-25)    Inverted bosu squat 10 reps x 10 cts     (R/L) SLS on bluefoam with opposite foot 2 cone tap x 10 reps ea    Step up over a 4 inch step (R/L) LE lead x 5 reps ea    NOEMI over rail x 1 improving her gait pattern, safety during stand to sit to stand transfer, and improving her posture. She needs SBA to CGA for improved safety during ADLs and transfers.   Her  is very helpful and is constantly with her to assist her is all home and

## 2019-03-19 RX ORDER — PROPRANOLOL HYDROCHLORIDE 10 MG/1
TABLET ORAL
Qty: 90 TABLET | Refills: 3 | Status: SHIPPED | OUTPATIENT
Start: 2019-03-19 | End: 2020-03-13

## 2019-03-29 RX ORDER — SIMVASTATIN 20 MG
TABLET ORAL
Qty: 90 TABLET | Refills: 0 | OUTPATIENT
Start: 2019-03-29

## 2019-04-09 RX ORDER — SIMVASTATIN 20 MG
TABLET ORAL
Qty: 90 TABLET | Refills: 0 | Status: SHIPPED | OUTPATIENT
Start: 2019-04-09 | End: 2019-07-02

## 2019-04-16 ENCOUNTER — TELEPHONE (OUTPATIENT)
Dept: NEUROLOGY | Facility: CLINIC | Age: 79
End: 2019-04-16

## 2019-04-16 NOTE — TELEPHONE ENCOUNTER
Requested Prescriptions     Pending Prescriptions Disp Refills   • LISINOPRIL 10 MG Oral Tab [Pharmacy Med Name: LISINOPRIL 10 MG TABLET] 90 tablet 1     Sig: TAKE 1 TABLET BY MOUTH EVERY DAY         Recent Visits  Date Type Provider Dept   01/24/19 Office

## 2019-04-19 RX ORDER — LISINOPRIL 10 MG/1
TABLET ORAL
Qty: 90 TABLET | Refills: 1 | Status: SHIPPED | OUTPATIENT
Start: 2019-04-19 | End: 2019-10-18

## 2019-04-22 ENCOUNTER — MA CHART PREP (OUTPATIENT)
Dept: FAMILY MEDICINE CLINIC | Facility: CLINIC | Age: 79
End: 2019-04-22

## 2019-04-22 PROBLEM — F02.80 LATE ONSET ALZHEIMER'S DISEASE WITHOUT BEHAVIORAL DISTURBANCE (HCC): Status: ACTIVE | Noted: 2019-04-22

## 2019-04-22 PROBLEM — G30.1 LATE ONSET ALZHEIMER'S DISEASE WITHOUT BEHAVIORAL DISTURBANCE (HCC): Status: ACTIVE | Noted: 2019-04-22

## 2019-04-22 PROBLEM — D32.9 MENINGIOMA (HCC): Status: ACTIVE | Noted: 2019-04-22

## 2019-05-21 ENCOUNTER — OFFICE VISIT (OUTPATIENT)
Dept: INTERNAL MEDICINE CLINIC | Facility: CLINIC | Age: 79
End: 2019-05-21
Payer: COMMERCIAL

## 2019-05-21 ENCOUNTER — TELEPHONE (OUTPATIENT)
Dept: INTERNAL MEDICINE CLINIC | Facility: CLINIC | Age: 79
End: 2019-05-21

## 2019-05-21 VITALS
WEIGHT: 154.13 LBS | SYSTOLIC BLOOD PRESSURE: 102 MMHG | HEART RATE: 80 BPM | DIASTOLIC BLOOD PRESSURE: 69 MMHG | TEMPERATURE: 97 F | BODY MASS INDEX: 29 KG/M2

## 2019-05-21 DIAGNOSIS — G95.9 CERVICAL MYELOPATHY (HCC): ICD-10-CM

## 2019-05-21 DIAGNOSIS — E78.00 PURE HYPERCHOLESTEROLEMIA: ICD-10-CM

## 2019-05-21 DIAGNOSIS — F02.80 LATE ONSET ALZHEIMER'S DISEASE WITHOUT BEHAVIORAL DISTURBANCE (HCC): ICD-10-CM

## 2019-05-21 DIAGNOSIS — G30.1 LATE ONSET ALZHEIMER'S DISEASE WITHOUT BEHAVIORAL DISTURBANCE (HCC): ICD-10-CM

## 2019-05-21 DIAGNOSIS — R55 SYNCOPE, UNSPECIFIED SYNCOPE TYPE: Primary | ICD-10-CM

## 2019-05-21 PROCEDURE — G0463 HOSPITAL OUTPT CLINIC VISIT: HCPCS | Performed by: INTERNAL MEDICINE

## 2019-05-21 PROCEDURE — 99214 OFFICE O/P EST MOD 30 MIN: CPT | Performed by: INTERNAL MEDICINE

## 2019-05-21 PROCEDURE — 1111F DSCHRG MED/CURRENT MED MERGE: CPT | Performed by: INTERNAL MEDICINE

## 2019-05-21 RX ORDER — HYDROCHLOROTHIAZIDE 25 MG/1
25 TABLET ORAL
Qty: 90 TABLET | Refills: 1 | Status: SHIPPED | OUTPATIENT
Start: 2019-05-21 | End: 2019-10-22

## 2019-05-21 NOTE — TELEPHONE ENCOUNTER
Pharmacy calling for Hydrochlorothiazide 25 mg refill. Refilling now.   Hypertensive Medications  Protocol Criteria:  · Appointment scheduled in the past 6 months or in the next 3 months  · BMP or CMP in the past 12 months  · Creatinine result < 2  Recent O

## 2019-05-23 ENCOUNTER — TELEPHONE (OUTPATIENT)
Dept: INTERNAL MEDICINE CLINIC | Facility: CLINIC | Age: 79
End: 2019-05-23

## 2019-05-23 NOTE — TELEPHONE ENCOUNTER
Pt  is requesting a call back from nurse conforming that Dr received paper works from [facility] for Pt      Please advise

## 2019-05-23 NOTE — TELEPHONE ENCOUNTER
Late entry did inform pt's  camryn we did receive ER notes for pt on Tuesday. Nicholas Larios they have about 4 different ppl coming to their house for pt, it the , PT, and OT. Nicholas Larios he also received a call for speech Therapy.   Camryn zepeda

## 2019-05-25 NOTE — PROGRESS NOTES
HPI:    Patient ID: Huber Castle is a 69year old female. Patient presents with:  Hospital F/U: [facility] fup- Mini Stroke.      \A Chronology of [place] [facility]\""  Hospital F/U - Syncope  S/P [facility] (Sx) aphasia and sudde choking, shortness of breath and wheezing. Cardiovascular: Negative for chest pain and leg swelling. Gastrointestinal: Negative for diarrhea, nausea and vomiting. Endocrine: Negative for polydipsia, polyphagia and polyuria.    Genitourinary: Negative times daily. Disp: 180 tablet Rfl: 3   FLUoxetine HCl (PROZAC) 20 MG Oral Cap Take 20 mg by mouth daily. Disp:  Rfl:    hydrochlorothiazide 25 MG Oral Tab Take 1 tablet (25 mg total) by mouth once daily.  Disp: 90 tablet Rfl: 1   BuPROPion HCl ER, SR, 100 M EAG  Lab Results   Component Value Date    CHOLEST 140 03/04/2017    CHOLEST 157 07/18/2016    CHOLEST 150 05/01/2015     Lab Results   Component Value Date    HDL 59 03/04/2017    HDL 59 07/18/2016    HDL 62 (H) 05/01/2015     Lab Results   Component Valu

## 2019-05-29 ENCOUNTER — TELEPHONE (OUTPATIENT)
Dept: INTERNAL MEDICINE CLINIC | Facility: CLINIC | Age: 79
End: 2019-05-29

## 2019-05-29 RX ORDER — HYDROCHLOROTHIAZIDE 25 MG/1
TABLET ORAL
Qty: 90 TABLET | Refills: 1 | OUTPATIENT
Start: 2019-05-29

## 2019-05-29 NOTE — TELEPHONE ENCOUNTER
Balta PT/[facility] called in requesting to add on a speech therapist to case. She states that Pt is having issues with following commands and memory. Please advise.

## 2019-06-06 ENCOUNTER — TELEPHONE (OUTPATIENT)
Dept: INTERNAL MEDICINE CLINIC | Facility: CLINIC | Age: 79
End: 2019-06-06

## 2019-06-14 ENCOUNTER — TELEPHONE (OUTPATIENT)
Dept: INTERNAL MEDICINE CLINIC | Facility: CLINIC | Age: 79
End: 2019-06-14

## 2019-06-14 NOTE — TELEPHONE ENCOUNTER
Emily Gomez called back and informed of note below stts yes he would like Speech Therapist added. Freddy Coates has been going and he is impressed with the work they are doing with pt. Dr. Precious Cardoza to add speech therapist to case?

## 2019-06-14 NOTE — TELEPHONE ENCOUNTER
LMTCB for Katie Ash pt's . Want to make sure he is ok with  Home Health PT adding on Speech Therapy for pt? I know he had mentioned before he had a lot of ppl coming in for pt. Just need to make sure before I forward message to MD. Thanks.

## 2019-06-17 NOTE — TELEPHONE ENCOUNTER
Balta PT/[facility] called in stating that she is following up on request to extend PT for 2 more days starting this week. Please advise.

## 2019-06-17 NOTE — TELEPHONE ENCOUNTER
Carroll macias informing her Dr. Bakari Caruso ok'd speech Therapy to be added to case. Call back with any questions.

## 2019-06-20 ENCOUNTER — TELEPHONE (OUTPATIENT)
Dept: INTERNAL MEDICINE CLINIC | Facility: CLINIC | Age: 79
End: 2019-06-20

## 2019-06-20 NOTE — TELEPHONE ENCOUNTER
Balta from [facility] is calling states needs verbal orders for pt to continue physical therapy for 2x a week for 2 weeks. Please Advise.

## 2019-06-21 ENCOUNTER — TELEPHONE (OUTPATIENT)
Dept: INTERNAL MEDICINE CLINIC | Facility: CLINIC | Age: 79
End: 2019-06-21

## 2019-06-21 ENCOUNTER — OFFICE VISIT (OUTPATIENT)
Dept: INTERNAL MEDICINE CLINIC | Facility: CLINIC | Age: 79
End: 2019-06-21
Payer: COMMERCIAL

## 2019-06-21 VITALS
BODY MASS INDEX: 29.84 KG/M2 | TEMPERATURE: 98 F | RESPIRATION RATE: 12 BRPM | SYSTOLIC BLOOD PRESSURE: 140 MMHG | WEIGHT: 152 LBS | DIASTOLIC BLOOD PRESSURE: 71 MMHG | HEART RATE: 76 BPM | HEIGHT: 60 IN

## 2019-06-21 DIAGNOSIS — I10 ESSENTIAL HYPERTENSION, BENIGN: ICD-10-CM

## 2019-06-21 DIAGNOSIS — R26.9 GAIT ABNORMALITY: ICD-10-CM

## 2019-06-21 DIAGNOSIS — G45.9 TIA (TRANSIENT ISCHEMIC ATTACK): Primary | ICD-10-CM

## 2019-06-21 DIAGNOSIS — G30.1 LATE ONSET ALZHEIMER'S DISEASE WITHOUT BEHAVIORAL DISTURBANCE (HCC): ICD-10-CM

## 2019-06-21 DIAGNOSIS — E78.00 PURE HYPERCHOLESTEROLEMIA: ICD-10-CM

## 2019-06-21 DIAGNOSIS — F02.80 LATE ONSET ALZHEIMER'S DISEASE WITHOUT BEHAVIORAL DISTURBANCE (HCC): ICD-10-CM

## 2019-06-21 DIAGNOSIS — D32.9 MENINGIOMA (HCC): ICD-10-CM

## 2019-06-21 PROCEDURE — G0463 HOSPITAL OUTPT CLINIC VISIT: HCPCS | Performed by: INTERNAL MEDICINE

## 2019-06-21 PROCEDURE — 99214 OFFICE O/P EST MOD 30 MIN: CPT | Performed by: INTERNAL MEDICINE

## 2019-06-21 NOTE — TELEPHONE ENCOUNTER
Call returned by patient's spouse who confirms that EEG was done at [facility]. Spouse states he will go to [facility] on Monday and obtain copies of the EEG.

## 2019-06-21 NOTE — PROGRESS NOTES
HPI:    Patient ID: Austin Frost is a 69year old female. Patient presents today with spouse and daughter for ffup/checkup. She has history of alzheimers dementia, hypertension and hyperlipidemia.  About 2 mos ago, was admitted at [facility] after having and TABLET BY MOUTH NIGHTLY Disp: 90 tablet Rfl: 0   PROPRANOLOL HCL 10 MG Oral Tab TAKE 1 TABLET BY MOUTH EVERY DAY Disp: 90 tablet Rfl: 3   Memantine HCl 10 MG Oral Tab Take 1 tablet (10 mg total) by mouth 2 (two) times daily.  Disp: 180 tablet Rfl: 3   BuPRO incontinence, tongue biting. She had negative MRI brain. EEG apparenlty was also normal. She has had no recurrence of this episode.  Not clear whether this is really TIA, I told spouse to have pt ffup with neuro Dr Hilda Emerson.    (D32.9) Meningioma [facility])  Plan:

## 2019-06-21 NOTE — TELEPHONE ENCOUNTER
LMTCB with spouse.  Need to verify which hospital patient [place] the [place] ([facility])

## 2019-06-22 PROBLEM — G45.9 TIA (TRANSIENT ISCHEMIC ATTACK): Status: ACTIVE | Noted: 2019-06-22

## 2019-06-25 ENCOUNTER — TELEPHONE (OUTPATIENT)
Dept: NEUROLOGY | Facility: CLINIC | Age: 79
End: 2019-06-25

## 2019-06-25 ENCOUNTER — TELEPHONE (OUTPATIENT)
Dept: INTERNAL MEDICINE CLINIC | Facility: CLINIC | Age: 79
End: 2019-06-25

## 2019-06-25 NOTE — TELEPHONE ENCOUNTER
Denilson Carlos RN/[facility] called in stating that Pt is doing well with RN and reaching baseline with her. Pt is also continuing PT and OT, and RN states that is really helping Pt.       She states that she would like to know if she should continue visiting Pt or if she

## 2019-06-26 ENCOUNTER — TELEPHONE (OUTPATIENT)
Dept: INTERNAL MEDICINE CLINIC | Facility: CLINIC | Age: 79
End: 2019-06-26

## 2019-06-26 ENCOUNTER — MED REC SCAN ONLY (OUTPATIENT)
Dept: NEUROLOGY | Facility: CLINIC | Age: 79
End: 2019-06-26

## 2019-06-26 NOTE — TELEPHONE ENCOUNTER
Agree with additional speech therapy. Do I need to sign any orders or is a verbal order for speech therapy ok?

## 2019-07-02 ENCOUNTER — TELEPHONE (OUTPATIENT)
Dept: INTERNAL MEDICINE CLINIC | Facility: CLINIC | Age: 79
End: 2019-07-02

## 2019-07-02 RX ORDER — SIMVASTATIN 20 MG
TABLET ORAL
Qty: 90 TABLET | Refills: 1 | Status: SHIPPED | OUTPATIENT
Start: 2019-07-02 | End: 2019-12-18

## 2019-07-02 NOTE — TELEPHONE ENCOUNTER
[address] [place] [address] is requesting verbal order to extended OT/PT      OT extended for 1 time a week starting next week     PT extended for 2 times a week starting next week     Planned will be to discharge pt at the end of next week from agency       Please

## 2019-07-02 NOTE — TELEPHONE ENCOUNTER
Spoke with [address] [place] [address] and informed of Dr. Rodolfo Jimenez note below and she voiced understanding.

## 2019-07-02 NOTE — TELEPHONE ENCOUNTER
Per pharmacy pt is requesting refill for the following medication.  Please advise       •  SIMVASTATIN 20 MG Oral Tab, TAKE ONE TABLET BY MOUTH NIGHTLY, Disp: 90 tablet, Rfl: 0

## 2019-07-02 NOTE — TELEPHONE ENCOUNTER
Please review; protocol failed.     Requested Prescriptions     Pending Prescriptions Disp Refills   • simvastatin 20 MG Oral Tab 90 tablet 0     Sig: TAKE ONE TABLET BY MOUTH NIGHTLY         Recent Visits  Date Type Provider Dept   06/21/19 Office Visit Tyrese Ryan

## 2019-07-05 ENCOUNTER — TELEPHONE (OUTPATIENT)
Dept: INTERNAL MEDICINE CLINIC | Facility: CLINIC | Age: 79
End: 2019-07-05

## 2019-07-09 ENCOUNTER — TELEPHONE (OUTPATIENT)
Dept: INTERNAL MEDICINE CLINIC | Facility: CLINIC | Age: 79
End: 2019-07-09

## 2019-07-22 ENCOUNTER — TELEPHONE (OUTPATIENT)
Dept: INTERNAL MEDICINE CLINIC | Facility: CLINIC | Age: 79
End: 2019-07-22

## 2019-07-23 NOTE — TELEPHONE ENCOUNTER
Order's # [id number], [id number] and [id number] signed by Dr. Hafsa Villanueva, faxed back and confirmed sent. Original placed for scanning to chart.

## 2019-07-25 ENCOUNTER — HOSPITAL (OUTPATIENT)
Dept: OTHER | Age: 79
End: 2019-07-25

## 2019-07-25 ENCOUNTER — DIAGNOSTIC TRANS (OUTPATIENT)
Dept: OTHER | Age: 79
End: 2019-07-25

## 2019-07-25 ENCOUNTER — TELEPHONE (OUTPATIENT)
Dept: NEUROLOGY | Facility: CLINIC | Age: 79
End: 2019-07-25

## 2019-07-25 LAB
ALBUMIN SERPL-MCNC: 3.2 G/DL (ref 3.6–5.1)
ALBUMIN/GLOB SERPL: 0.8 {RATIO} (ref 1–2.4)
ALP SERPL-CCNC: 101 UNITS/L (ref 45–117)
ALT SERPL-CCNC: 18 UNITS/L
ANALYZER ANC (IANC): ABNORMAL
ANION GAP SERPL CALC-SCNC: 11 MMOL/L (ref 10–20)
APTT PPP: 22 SEC (ref 22–32)
APTT PPP: NORMAL S
APTT PPP: NORMAL S
AST SERPL-CCNC: 21 UNITS/L
BASOPHILS # BLD: 0.1 K/MCL (ref 0–0.3)
BASOPHILS NFR BLD: 1 %
BILIRUB SERPL-MCNC: 0.3 MG/DL (ref 0.2–1)
BUN SERPL-MCNC: 23 MG/DL (ref 6–20)
BUN/CREAT SERPL: 24 (ref 7–25)
CALCIUM SERPL-MCNC: 8.9 MG/DL (ref 8.4–10.2)
CHLORIDE SERPL-SCNC: 105 MMOL/L (ref 98–107)
CO2 SERPL-SCNC: 28 MMOL/L (ref 21–32)
CREAT SERPL-MCNC: 0.94 MG/DL (ref 0.51–0.95)
DIFFERENTIAL METHOD BLD: ABNORMAL
EOSINOPHIL # BLD: 0.2 K/MCL (ref 0.1–0.5)
EOSINOPHIL NFR BLD: 3 %
ERYTHROCYTE [DISTWIDTH] IN BLOOD: 14.1 % (ref 11–15)
GLOBULIN SER-MCNC: 3.8 G/DL (ref 2–4)
GLUCOSE SERPL-MCNC: 116 MG/DL (ref 65–99)
HCT VFR BLD CALC: 39 % (ref 36–46.5)
HGB BLD-MCNC: 12.2 G/DL (ref 12–15.5)
IMM GRANULOCYTES # BLD AUTO: 0 K/MCL (ref 0–0.2)
IMM GRANULOCYTES NFR BLD: 0 %
INR PPP: 1
INR PPP: NORMAL
LYMPHOCYTES # BLD: 1.3 K/MCL (ref 1–4)
LYMPHOCYTES NFR BLD: 25 %
MCH RBC QN AUTO: 27.9 PG (ref 26–34)
MCHC RBC AUTO-ENTMCNC: 31.3 G/DL (ref 32–36.5)
MCV RBC AUTO: 89.2 FL (ref 78–100)
MONOCYTES # BLD: 0.5 K/MCL (ref 0.3–0.9)
MONOCYTES NFR BLD: 9 %
NEUTROPHILS # BLD: 3.3 K/MCL (ref 1.8–7.7)
NEUTROPHILS NFR BLD: 62 %
NEUTS SEG NFR BLD: ABNORMAL %
NRBC (NRBCRE): 0 /100 WBC
PLATELET # BLD: 286 K/MCL (ref 140–450)
POTASSIUM SERPL-SCNC: 3.6 MMOL/L (ref 3.4–5.1)
POTASSIUM SERPL-SCNC: ABNORMAL MMOL/L
PROT SERPL-MCNC: 7 G/DL (ref 6.4–8.2)
PROTHROMBIN TIME (PRT2): NORMAL
PROTHROMBIN TIME: 10 SEC (ref 9.7–11.8)
RBC # BLD: 4.37 MIL/MCL (ref 4–5.2)
SODIUM SERPL-SCNC: 140 MMOL/L (ref 135–145)
WBC # BLD: 5.4 K/MCL (ref 4.2–11)

## 2019-07-30 ENCOUNTER — TELEPHONE (OUTPATIENT)
Dept: NEUROLOGY | Facility: CLINIC | Age: 79
End: 2019-07-30

## 2019-07-30 NOTE — TELEPHONE ENCOUNTER
Called and spoke with  to offer appointment on 8/1/2019 @5pm in [address] [place] [address].  accepted appointment and given to LD to add to schedule.      also verbally informed that  brought to office yesterday a blue folder of imaging Fannie Diaz

## 2019-07-31 NOTE — TELEPHONE ENCOUNTER
Call placed to [facility] at medical record at [facility] to obtain records or recent testing.      Request faxed to [phone]

## 2019-08-01 NOTE — PROGRESS NOTES
Ms Darci Juan, was in the office with her , daughter. She had 2 generalized seizures, evaluated at [facility].  They were told that this was attributable to Wellbutrin which was stopped. She had been on Wellbutrin for many years.   No prior

## 2019-08-14 ENCOUNTER — MED REC SCAN ONLY (OUTPATIENT)
Dept: NEUROLOGY | Facility: CLINIC | Age: 79
End: 2019-08-14

## 2019-08-27 NOTE — TELEPHONE ENCOUNTER
Message left on voicemail that I would call back.   OT Magalie Stahl from [facility] called for ext for ot for one time a week for 3 weeks

## 2019-09-23 ENCOUNTER — OFFICE VISIT (OUTPATIENT)
Dept: INTERNAL MEDICINE CLINIC | Facility: CLINIC | Age: 79
End: 2019-09-23
Payer: COMMERCIAL

## 2019-09-23 VITALS
BODY MASS INDEX: 30.63 KG/M2 | WEIGHT: 156 LBS | SYSTOLIC BLOOD PRESSURE: 106 MMHG | HEART RATE: 77 BPM | RESPIRATION RATE: 12 BRPM | DIASTOLIC BLOOD PRESSURE: 70 MMHG | HEIGHT: 60 IN

## 2019-09-23 DIAGNOSIS — I10 ESSENTIAL HYPERTENSION, BENIGN: ICD-10-CM

## 2019-09-23 DIAGNOSIS — G30.1 LATE ONSET ALZHEIMER'S DISEASE WITHOUT BEHAVIORAL DISTURBANCE (HCC): ICD-10-CM

## 2019-09-23 DIAGNOSIS — E78.00 PURE HYPERCHOLESTEROLEMIA: ICD-10-CM

## 2019-09-23 DIAGNOSIS — N18.30 CKD (CHRONIC KIDNEY DISEASE) STAGE 3, GFR 30-59 ML/MIN (HCC): Chronic | ICD-10-CM

## 2019-09-23 DIAGNOSIS — Z00.00 MEDICARE ANNUAL WELLNESS VISIT, SUBSEQUENT: Primary | ICD-10-CM

## 2019-09-23 DIAGNOSIS — R56.9 SEIZURES (HCC): ICD-10-CM

## 2019-09-23 DIAGNOSIS — D32.9 MENINGIOMA (HCC): ICD-10-CM

## 2019-09-23 DIAGNOSIS — F02.80 LATE ONSET ALZHEIMER'S DISEASE WITHOUT BEHAVIORAL DISTURBANCE (HCC): ICD-10-CM

## 2019-09-23 PROBLEM — G45.9 TIA (TRANSIENT ISCHEMIC ATTACK): Status: RESOLVED | Noted: 2019-06-22 | Resolved: 2019-09-23

## 2019-09-23 PROBLEM — G95.9 CERVICAL MYELOPATHY (HCC): Status: RESOLVED | Noted: 2019-01-24 | Resolved: 2019-09-23

## 2019-09-23 PROCEDURE — 90662 IIV NO PRSV INCREASED AG IM: CPT | Performed by: INTERNAL MEDICINE

## 2019-09-23 PROCEDURE — G0439 PPPS, SUBSEQ VISIT: HCPCS | Performed by: INTERNAL MEDICINE

## 2019-09-23 PROCEDURE — 90670 PCV13 VACCINE IM: CPT | Performed by: INTERNAL MEDICINE

## 2019-09-23 PROCEDURE — G0009 ADMIN PNEUMOCOCCAL VACCINE: HCPCS | Performed by: INTERNAL MEDICINE

## 2019-09-23 PROCEDURE — G0008 ADMIN INFLUENZA VIRUS VAC: HCPCS | Performed by: INTERNAL MEDICINE

## 2019-09-23 NOTE — PROGRESS NOTES
HPI:   Kate Arambula is a 69year old female who presents for a Medicare Subsequent Annual Wellness visit (Pt already had Initial Annual Wellness).     d  Her last annual assessment has been over 1 year: Annual Physical due on 06/25/2019         Fall/Risk but we do not have it in [employer], and patient is instructed to get our office a copy of it for scanning into Epic. She does NOT have a Power of  for [employer] on file in [address] [place] [address] Rd.    The patient has this document but we do not have it in Epic, and Tab Take 1 tablet (10 mg total) by mouth 2 (two) times daily. aspirin 81 MG Oral Chew Tab Chew 81 mg by mouth daily. FLUoxetine HCl (PROZAC) 20 MG Oral Cap Take 20 mg by mouth daily.         MEDICAL INFORMATION:   She  has a past medical history of Tiana Visual Acuity                           General Appearance:  Alert, cooperative, no distress, appears stated age   Head:  Normocephalic, without obvious abnormality, atraumatic   Eyes:  PERRL, conjunctiva/corneas clear, EOM's intact both eyes   Ears: flu shot and prevnar 13 today.  D/w pt/family regarding health screenings such as mammogram but family would want to think for now.     (I10) Essential hypertension, benign  Plan: bp controlled with current bp med. cpm.    (E78.00) Pure hypercholesterolemia 03/04/2017 90     GLUCOSE (P) (mg/dL)   Date Value   07/18/2016 89          Cardiovascular Disease Screening     LDL Annually LDL Cholesterol (mg/dL)   Date Value   03/04/2017 67   07/18/2016 82        EKG - w/ Initial Preventative Physical Exam only, or Factor VIII or IX concentrates   Clients of institutions for the mentally retarded   Persons who live in the same house as a HepB virus carrier   Homosexual men   Illicit injectable drug abusers     Tetanus Toxoid  Only covered with a cut with metal- TD an

## 2019-09-28 PROBLEM — H11.31 SUBCONJUNCTIVAL HEMORRHAGE OF RIGHT EYE: Status: RESOLVED | Noted: 2017-11-21 | Resolved: 2019-09-28

## 2019-10-18 NOTE — TELEPHONE ENCOUNTER
pharmacy called requesting refill for     LISINOPRIL 10 MG Oral Tab, TAKE 1 TABLET BY MOUTH EVERY DAY, D

## 2019-10-21 NOTE — TELEPHONE ENCOUNTER
Please review; protocol failed. Requested Prescriptions     Pending Prescriptions Disp Refills   • lisinopril 10 MG Oral Tab 90 tablet 1     Sig: Take 1 tablet (10 mg total) by mouth once daily.          Recent Visits  Date Type Provider Dept   09/23/19

## 2019-10-22 ENCOUNTER — NURSE TRIAGE (OUTPATIENT)
Dept: INTERNAL MEDICINE CLINIC | Facility: CLINIC | Age: 79
End: 2019-10-22

## 2019-10-22 ENCOUNTER — OFFICE VISIT (OUTPATIENT)
Dept: INTERNAL MEDICINE CLINIC | Facility: CLINIC | Age: 79
End: 2019-10-22
Payer: COMMERCIAL

## 2019-10-22 VITALS
BODY MASS INDEX: 30 KG/M2 | DIASTOLIC BLOOD PRESSURE: 70 MMHG | HEART RATE: 71 BPM | SYSTOLIC BLOOD PRESSURE: 100 MMHG | WEIGHT: 155 LBS

## 2019-10-22 DIAGNOSIS — H10.32 ACUTE CONJUNCTIVITIS OF LEFT EYE, UNSPECIFIED ACUTE CONJUNCTIVITIS TYPE: Primary | ICD-10-CM

## 2019-10-22 DIAGNOSIS — I10 ESSENTIAL HYPERTENSION, BENIGN: ICD-10-CM

## 2019-10-22 PROCEDURE — 99214 OFFICE O/P EST MOD 30 MIN: CPT | Performed by: INTERNAL MEDICINE

## 2019-10-22 PROCEDURE — G0463 HOSPITAL OUTPT CLINIC VISIT: HCPCS | Performed by: INTERNAL MEDICINE

## 2019-10-22 RX ORDER — TOBRAMYCIN 3 MG/ML
1 SOLUTION/ DROPS OPHTHALMIC EVERY 6 HOURS
Qty: 1 BOTTLE | Refills: 0 | Status: SHIPPED | OUTPATIENT
Start: 2019-10-22 | End: 2019-10-31

## 2019-10-22 RX ORDER — LISINOPRIL 10 MG/1
10 TABLET ORAL
Qty: 90 TABLET | Refills: 1 | Status: SHIPPED | OUTPATIENT
Start: 2019-10-22 | End: 2019-11-19 | Stop reason: DRUGHIGH

## 2019-10-22 NOTE — TELEPHONE ENCOUNTER
Patient authorized spouse Gisele Croft called in he stated Patient both eyes are hurting is getting worst and it has been 3 days

## 2019-10-22 NOTE — PROGRESS NOTES
HPI:    Patient ID: Otf Souza is a 69year old female. Conjunctivitis   This is a new problem. The current episode started more than 1 month ago (3 day s). The problem occurs constantly. The problem has been unchanged.  Pertinent negatives include n well-nourished. Non-toxic appearance. She does not have a sickly appearance. She does not appear ill. No distress.    HENT:   Right Ear: External ear normal.   Left Ear: External ear normal.   Nose: Nose normal.   Mouth/Throat: Oropharynx is clear and mois defined types were placed in this encounter.       Meds This Visit:  Requested Prescriptions     Signed Prescriptions Disp Refills   • Tobramycin Sulfate (TOBREX) 0.3 % Ophthalmic Solution 1 Bottle 0     Sig: Place 1 drop into the left eye every 6 (six) seema

## 2019-10-22 NOTE — TELEPHONE ENCOUNTER
Action Requested: Summary for Provider     []  Critical Lab, Recommendations Needed  [] Need Additional Advice  []   FYI    []   Need Orders  [] Need Medications Sent to Pharmacy  []  Other     SUMMARY: Spouse requesting appt with Dr. Abdirahman Holman today for

## 2019-10-30 ENCOUNTER — TELEPHONE (OUTPATIENT)
Dept: INTERNAL MEDICINE CLINIC | Facility: CLINIC | Age: 79
End: 2019-10-30

## 2019-10-30 DIAGNOSIS — H10.32 ACUTE CONJUNCTIVITIS OF LEFT EYE, UNSPECIFIED ACUTE CONJUNCTIVITIS TYPE: Primary | ICD-10-CM

## 2019-10-30 NOTE — TELEPHONE ENCOUNTER
Pt  Duncan eTrrell was called and informed of Dr. Cesar Hdez message below. I transferred the call to the opthalmology department to see if they can get her in today. MITRA Duffy pt will be seen tomorrow by  at 10:30.

## 2019-10-30 NOTE — TELEPHONE ENCOUNTER
Patient's , Naye Pillai, states patient went to see Dr. Neeru Wallace 10/22 due to eye issues.  states patient was prescribed medication Tobramycin Sulfate (TOBREX) 0.3 % Ophthalmic Solution, however, patient's symptoms still persist, She is having eye crust, pain, and redness.  is requesting a call back at # [phone] to discuss next plan of action.

## 2019-10-30 NOTE — TELEPHONE ENCOUNTER
Spoke with pt spouse (on WILLIAM) who states no improvement in left eye,  \"continues to drain crusty like drng, redness, and now she is complaining of pain in that eye. Her right eye is red too, so I have been using the Tobrex in that eye too. Today is the worst, she can't keep her hands off her eyes\"    Please advise.   Routed to covering provider for Dr Berta Maynard.

## 2019-10-30 NOTE — TELEPHONE ENCOUNTER
She will need an evaluation by a provider. Please have her sees whomever she can.   I will also place a referral to an ophthalmologist.

## 2019-10-31 ENCOUNTER — OFFICE VISIT (OUTPATIENT)
Dept: OPHTHALMOLOGY | Facility: CLINIC | Age: 79
End: 2019-10-31
Payer: COMMERCIAL

## 2019-10-31 DIAGNOSIS — H20.00 ACUTE IRITIS, LEFT EYE: Primary | ICD-10-CM

## 2019-10-31 PROBLEM — H57.12 DISCOMFORT OF LEFT EYE: Status: ACTIVE | Noted: 2019-10-31

## 2019-10-31 PROCEDURE — 99203 OFFICE O/P NEW LOW 30 MIN: CPT | Performed by: OPHTHALMOLOGY

## 2019-10-31 PROCEDURE — G0463 HOSPITAL OUTPT CLINIC VISIT: HCPCS | Performed by: OPHTHALMOLOGY

## 2019-10-31 RX ORDER — PREDNISOLONE ACETATE 10 MG/ML
SUSPENSION/ DROPS OPHTHALMIC
Qty: 1 BOTTLE | Refills: 0 | Status: SHIPPED | OUTPATIENT
Start: 2019-10-31 | End: 2019-11-07

## 2019-10-31 RX ORDER — KETOROLAC TROMETHAMINE 5 MG/ML
1 SOLUTION OPHTHALMIC 4 TIMES DAILY
Qty: 1 BOTTLE | Refills: 0 | Status: CANCELLED | OUTPATIENT
Start: 2019-10-31

## 2019-10-31 NOTE — PROGRESS NOTES
Kate Arambula is a 67year old female. HPI:     HPI     Consult      Additional comments: Consult Marilou Lundberg              Comments     Pt called yesterday stating that she was seen by Dr. Mk Olsen on 10/22/19 and was Dx with conjunctivitis OS.    Pt w total) by mouth once daily. , Disp: 90 tablet, Rfl: 1  simvastatin 20 MG Oral Tab, TAKE ONE TABLET BY MOUTH NIGHTLY, Disp: 90 tablet, Rfl: 1  PROPRANOLOL HCL 10 MG Oral Tab, TAKE 1 TABLET BY MOUTH EVERY DAY, Disp: 90 tablet, Rfl: 3  Memantine HCl 10 MG Oral the right eye. Return on 11/7/19. Patient should call office and make sooner return appointment if symptoms worsen or do not begin to improve. No orders of the defined types were placed in this encounter.       Meds This Visit:  Requested Prescript

## 2019-10-31 NOTE — ASSESSMENT & PLAN NOTE
Discussed diagnosis and treatment in detail with patient. Stop Tobramycin drops in both eyes. Start Pred forte 4 times a day in the left eye until next visit next week. No drops are needed in the right eye. Return on 11/7/19.   Patient should call o

## 2019-10-31 NOTE — PATIENT INSTRUCTIONS
Acute iritis, left eye  Discussed diagnosis and treatment in detail with patient. Stop Tobramycin drops in both eyes. Start Pred forte 4 times a day in the left eye until next visit next week. No drops are needed in the right eye.    Return on 11/7/19 these occur:  · Symptoms don’t start to improve after 1 week  · Iritis from eye injury causes symptoms for more than 2 weeks  · Pain increases  · Your eye becomes red  · You lose some or all of your vision  Date Last Reviewed: 7/1/2017  © 6703-3303 [employer] the cornea (band keratopathy)  · Optic nerve damage  If severe, problems like these can cause partial or total loss of eyesight. Your eye doctor will try to prevent these problems by treating your iritis right away.  You may need to take medicine often to c the iris is the pupil. It opens and closes to control how much light enters your eye. The middle part of the eye is called the uvea. It includes the iris. With iritis, the iris and anterior chamber are inflamed. Iritis is a type of uveitis.  This is an inf 27890. All rights reserved. This information is not intended as a substitute for professional medical care. Always follow your healthcare professional's instructions.

## 2019-11-07 ENCOUNTER — OFFICE VISIT (OUTPATIENT)
Dept: OPHTHALMOLOGY | Facility: CLINIC | Age: 79
End: 2019-11-07
Payer: COMMERCIAL

## 2019-11-07 DIAGNOSIS — H20.00 ACUTE IRITIS, LEFT EYE: Primary | ICD-10-CM

## 2019-11-07 DIAGNOSIS — H16.143 SPK (SUPERFICIAL PUNCTATE KERATITIS), BILATERAL: ICD-10-CM

## 2019-11-07 PROCEDURE — 99213 OFFICE O/P EST LOW 20 MIN: CPT | Performed by: OPHTHALMOLOGY

## 2019-11-07 PROCEDURE — G0463 HOSPITAL OUTPT CLINIC VISIT: HCPCS | Performed by: OPHTHALMOLOGY

## 2019-11-07 RX ORDER — PREDNISOLONE ACETATE 10 MG/ML
SUSPENSION/ DROPS OPHTHALMIC
Qty: 1 BOTTLE | Refills: 0 | Status: SHIPPED | OUTPATIENT
Start: 2019-11-07 | End: 2019-11-21

## 2019-11-07 NOTE — ASSESSMENT & PLAN NOTE
Use any brand of artificial tears 4-6 times a day in both eyes every day. Information on Systane given, but can use any brand. Patient was instructed to use warm compresses to the eyelids twice a day everyday.     Instructions for warm compress use:

## 2019-11-07 NOTE — ASSESSMENT & PLAN NOTE
Improved. Begin to wean off of Pred forte drops in the left eye. Use 1 drop 3 times a day for 5 days, then 2 times a day for 5 days, then once a day for 5 days, then discontinue.

## 2019-11-07 NOTE — PROGRESS NOTES
Frances Nickerson is a 67year old female. HPI:     HPI     Pt is here for a recheck iritis OS. Pt is taking Pred Forte OS QID, last used this morning at 8:00 am. Pt is still having some pain (7/10) and  itching OS. Pt states redness OS had gotten better. MG Oral Tab Take 1 tablet (10 mg total) by mouth 2 (two) times daily. 180 tablet 3   • aspirin 81 MG Oral Chew Tab Chew 81 mg by mouth daily. • FLUoxetine HCl (PROZAC) 20 MG Oral Cap Take 20 mg by mouth daily.            Allergies:    Aspirin morning and every night. After 5 minutes of holding the warm compresses on the eyelids, patient should gently rub the eyelashes and then rinse thoroughly with warm water. No orders of the defined types were placed in this encounter.       Meds Th

## 2019-11-07 NOTE — PATIENT INSTRUCTIONS
Acute iritis, left eye  Improved. Begin to wean off of Pred forte drops in the left eye. Use 1 drop 3 times a day for 5 days, then 2 times a day for 5 days, then once a day for 5 days, then discontinue.      SPK (superficial punctate keratitis), bilater

## 2019-11-12 ENCOUNTER — TELEPHONE (OUTPATIENT)
Dept: OPHTHALMOLOGY | Facility: CLINIC | Age: 79
End: 2019-11-12

## 2019-11-12 NOTE — TELEPHONE ENCOUNTER
pt. is not able to keep 12/5/19 and has resched to 12/12/19 at 4:30 - for EP/ EE complete EE- RJM to check corneas prior to dilate. Is appt. Date and time ok. ?

## 2019-11-19 ENCOUNTER — OFFICE VISIT (OUTPATIENT)
Dept: INTERNAL MEDICINE CLINIC | Facility: CLINIC | Age: 79
End: 2019-11-19
Payer: COMMERCIAL

## 2019-11-19 VITALS
WEIGHT: 150 LBS | HEIGHT: 61 IN | BODY MASS INDEX: 28.32 KG/M2 | DIASTOLIC BLOOD PRESSURE: 72 MMHG | RESPIRATION RATE: 12 BRPM | TEMPERATURE: 98 F | SYSTOLIC BLOOD PRESSURE: 106 MMHG | HEART RATE: 79 BPM

## 2019-11-19 DIAGNOSIS — I10 ESSENTIAL HYPERTENSION, BENIGN: Primary | ICD-10-CM

## 2019-11-19 DIAGNOSIS — H20.00 ACUTE IRITIS, LEFT EYE: ICD-10-CM

## 2019-11-19 PROCEDURE — G0463 HOSPITAL OUTPT CLINIC VISIT: HCPCS | Performed by: INTERNAL MEDICINE

## 2019-11-19 PROCEDURE — 99213 OFFICE O/P EST LOW 20 MIN: CPT | Performed by: INTERNAL MEDICINE

## 2019-11-19 RX ORDER — LISINOPRIL 5 MG/1
5 TABLET ORAL DAILY
Qty: 30 TABLET | Refills: 0 | Status: SHIPPED | OUTPATIENT
Start: 2019-11-19 | End: 2019-12-05

## 2019-11-19 NOTE — PROGRESS NOTES
HPI:    Patient ID: Frances Nickerson is a 67year old female. Presents today with her spouse for follow-up regarding her blood pressure. On her previous visit, she was getting lightheaded whenever she is to drop from lying or sitting position.   He felt t HCL 10 MG Oral Tab TAKE 1 TABLET BY MOUTH EVERY DAY 90 tablet 3   • Memantine HCl 10 MG Oral Tab Take 1 tablet (10 mg total) by mouth 2 (two) times daily. 180 tablet 3   • aspirin 81 MG Oral Chew Tab Chew 81 mg by mouth daily.      • FLUoxetine HCl (PROZAC) diagnosis)  Plan: Blood pressure remains to be on the lower side and blood pressure are did drop from lying to sitting position down to 90/60 from 106/76 on lying position.   We already had stopped her hydrochlorothiazide and I told her and the spouse that

## 2019-12-05 NOTE — TELEPHONE ENCOUNTER
Current Outpatient Medications:   •  lisinopril 5 MG Oral Tab, Take 1 tablet (5 mg total) by mouth daily. , Disp: 30 tablet, Rfl: 0  •    Requesting 90 days supply.

## 2019-12-06 RX ORDER — LISINOPRIL 5 MG/1
5 TABLET ORAL DAILY
Qty: 90 TABLET | Refills: 1 | Status: SHIPPED | OUTPATIENT
Start: 2019-12-06 | End: 2020-01-27

## 2019-12-07 NOTE — TELEPHONE ENCOUNTER
Please review; protocol failed. D/t labs  Requested Prescriptions     Pending Prescriptions Disp Refills   • lisinopril 5 MG Oral Tab 90 tablet 0     Sig: Take 1 tablet (5 mg total) by mouth daily.          Recent Visits  Date Type Provider Dept   11/19/19

## 2019-12-12 ENCOUNTER — OFFICE VISIT (OUTPATIENT)
Dept: OPHTHALMOLOGY | Facility: CLINIC | Age: 79
End: 2019-12-12
Payer: COMMERCIAL

## 2019-12-12 DIAGNOSIS — H25.13 AGE-RELATED NUCLEAR CATARACT OF BOTH EYES: ICD-10-CM

## 2019-12-12 DIAGNOSIS — H43.393 FLOATER, VITREOUS, BILATERAL: ICD-10-CM

## 2019-12-12 DIAGNOSIS — H16.143 SPK (SUPERFICIAL PUNCTATE KERATITIS), BILATERAL: Primary | ICD-10-CM

## 2019-12-12 DIAGNOSIS — H35.30 ARMD (AGE-RELATED MACULAR DEGENERATION), BILATERAL: ICD-10-CM

## 2019-12-12 PROBLEM — H20.00 ACUTE IRITIS, LEFT EYE: Status: RESOLVED | Noted: 2019-10-31 | Resolved: 2019-12-12

## 2019-12-12 PROCEDURE — 92014 COMPRE OPH EXAM EST PT 1/>: CPT | Performed by: OPHTHALMOLOGY

## 2019-12-12 NOTE — ASSESSMENT & PLAN NOTE
Discussed moderate cataracts in both eyes that are not affecting vision and are not surgical at this time.

## 2019-12-12 NOTE — ASSESSMENT & PLAN NOTE
Resolved in the right eye; slight residual in the left eye. Continue to use any brand of artificial tears 1-2 times a day in both eyes every day. Information on Systane given, but can use any brand.     Patient was instructed to use warm compresses to th

## 2019-12-12 NOTE — PATIENT INSTRUCTIONS
SPK (superficial punctate keratitis), bilateral  Resolved in the right eye; slight residual in the left eye. Continue to use any brand of artificial tears 1-2 times a day in both eyes every day. Information on Systane given, but can use any brand.     Pa

## 2019-12-13 NOTE — PROGRESS NOTES
Minerva Quinones is a 67year old female. HPI:     HPI     Consult      Additional comments: Per Dr. Gwendolyn Aldridge and recheck corneas per CARMEN. ( Hx of iritis 10/31/19).  Pt was last seen on 11/7/19 and was instructed to use 1 drop 3 TAKE ONE TABLET BY MOUTH NIGHTLY 90 tablet 1   • PROPRANOLOL HCL 10 MG Oral Tab TAKE 1 TABLET BY MOUTH EVERY DAY 90 tablet 3   • Memantine HCl 10 MG Oral Tab Take 1 tablet (10 mg total) by mouth 2 (two) times daily.  180 tablet 3   • aspirin 81 MG Oral Chew ASSESSMENT/PLAN:     Diagnoses and Plan:     SPK (superficial punctate keratitis), bilateral  Resolved in the right eye; slight residual in the left eye. Continue to use any brand of artificial tears 1-2 times a day in both eyes every day.   Information

## 2019-12-19 RX ORDER — SIMVASTATIN 20 MG
TABLET ORAL
Qty: 90 TABLET | Refills: 1 | Status: SHIPPED | OUTPATIENT
Start: 2019-12-19 | End: 2020-11-09

## 2019-12-19 NOTE — TELEPHONE ENCOUNTER
Please review; protocol failed.     Requested Prescriptions     Pending Prescriptions Disp Refills   • SIMVASTATIN 20 MG Oral Tab [Pharmacy Med Name: SIMVASTATIN 20 MG TABLET] 90 tablet 1     Sig: TAKE 1 TABLET BY MOUTH EVERY DAY AT [facility]

## 2020-01-01 ENCOUNTER — LAB ENCOUNTER (OUTPATIENT)
Dept: LAB | Age: 80
End: 2020-01-01
Attending: INTERNAL MEDICINE
Payer: MEDICARE

## 2020-01-01 ENCOUNTER — NURSE TRIAGE (OUTPATIENT)
Dept: INTERNAL MEDICINE CLINIC | Facility: CLINIC | Age: 80
End: 2020-01-01

## 2020-01-01 ENCOUNTER — TELEPHONE (OUTPATIENT)
Dept: INTERNAL MEDICINE CLINIC | Facility: CLINIC | Age: 80
End: 2020-01-01

## 2020-01-01 ENCOUNTER — OFFICE VISIT (OUTPATIENT)
Dept: INTERNAL MEDICINE CLINIC | Facility: CLINIC | Age: 80
End: 2020-01-01
Payer: COMMERCIAL

## 2020-01-01 VITALS
TEMPERATURE: 96 F | RESPIRATION RATE: 12 BRPM | BODY MASS INDEX: 28.13 KG/M2 | HEIGHT: 61 IN | DIASTOLIC BLOOD PRESSURE: 70 MMHG | WEIGHT: 149 LBS | HEART RATE: 68 BPM | SYSTOLIC BLOOD PRESSURE: 114 MMHG

## 2020-01-01 DIAGNOSIS — R55 SYNCOPE, UNSPECIFIED SYNCOPE TYPE: Primary | ICD-10-CM

## 2020-01-01 DIAGNOSIS — R55 SYNCOPE, UNSPECIFIED SYNCOPE TYPE: ICD-10-CM

## 2020-01-01 DIAGNOSIS — D64.9 NORMOCYTIC ANEMIA: Primary | ICD-10-CM

## 2020-01-01 DIAGNOSIS — D64.9 NORMOCYTIC ANEMIA: ICD-10-CM

## 2020-01-01 PROCEDURE — 84466 ASSAY OF TRANSFERRIN: CPT

## 2020-01-01 PROCEDURE — 3008F BODY MASS INDEX DOCD: CPT | Performed by: INTERNAL MEDICINE

## 2020-01-01 PROCEDURE — 80053 COMPREHEN METABOLIC PANEL: CPT

## 2020-01-01 PROCEDURE — 83540 ASSAY OF IRON: CPT

## 2020-01-01 PROCEDURE — 93005 ELECTROCARDIOGRAM TRACING: CPT | Performed by: INTERNAL MEDICINE

## 2020-01-01 PROCEDURE — 99214 OFFICE O/P EST MOD 30 MIN: CPT | Performed by: INTERNAL MEDICINE

## 2020-01-01 PROCEDURE — 36415 COLL VENOUS BLD VENIPUNCTURE: CPT

## 2020-01-01 PROCEDURE — 82728 ASSAY OF FERRITIN: CPT

## 2020-01-01 PROCEDURE — 3074F SYST BP LT 130 MM HG: CPT | Performed by: INTERNAL MEDICINE

## 2020-01-01 PROCEDURE — 3078F DIAST BP <80 MM HG: CPT | Performed by: INTERNAL MEDICINE

## 2020-01-01 PROCEDURE — G0463 HOSPITAL OUTPT CLINIC VISIT: HCPCS | Performed by: INTERNAL MEDICINE

## 2020-01-01 PROCEDURE — 93010 ELECTROCARDIOGRAM REPORT: CPT | Performed by: INTERNAL MEDICINE

## 2020-01-01 PROCEDURE — 85025 COMPLETE CBC W/AUTO DIFF WBC: CPT

## 2020-01-27 ENCOUNTER — OFFICE VISIT (OUTPATIENT)
Dept: INTERNAL MEDICINE CLINIC | Facility: CLINIC | Age: 80
End: 2020-01-27
Payer: COMMERCIAL

## 2020-01-27 VITALS
HEIGHT: 61 IN | WEIGHT: 153 LBS | HEART RATE: 79 BPM | TEMPERATURE: 98 F | BODY MASS INDEX: 28.89 KG/M2 | SYSTOLIC BLOOD PRESSURE: 98 MMHG | DIASTOLIC BLOOD PRESSURE: 70 MMHG | RESPIRATION RATE: 12 BRPM

## 2020-01-27 DIAGNOSIS — F32.A ANXIETY AND DEPRESSION: ICD-10-CM

## 2020-01-27 DIAGNOSIS — H16.143 SPK (SUPERFICIAL PUNCTATE KERATITIS), BILATERAL: ICD-10-CM

## 2020-01-27 DIAGNOSIS — H35.30 ARMD (AGE-RELATED MACULAR DEGENERATION), BILATERAL: ICD-10-CM

## 2020-01-27 DIAGNOSIS — I10 ESSENTIAL HYPERTENSION, BENIGN: ICD-10-CM

## 2020-01-27 DIAGNOSIS — Z00.00 MEDICARE ANNUAL WELLNESS VISIT, SUBSEQUENT: Primary | ICD-10-CM

## 2020-01-27 DIAGNOSIS — F41.9 ANXIETY AND DEPRESSION: ICD-10-CM

## 2020-01-27 DIAGNOSIS — N18.30 CKD (CHRONIC KIDNEY DISEASE) STAGE 3, GFR 30-59 ML/MIN (HCC): ICD-10-CM

## 2020-01-27 DIAGNOSIS — F02.80 LATE ONSET ALZHEIMER'S DISEASE WITHOUT BEHAVIORAL DISTURBANCE (HCC): ICD-10-CM

## 2020-01-27 DIAGNOSIS — G30.1 LATE ONSET ALZHEIMER'S DISEASE WITHOUT BEHAVIORAL DISTURBANCE (HCC): ICD-10-CM

## 2020-01-27 DIAGNOSIS — D32.9 MENINGIOMA (HCC): ICD-10-CM

## 2020-01-27 DIAGNOSIS — Z91.81 AT RISK FOR FALLS: ICD-10-CM

## 2020-01-27 DIAGNOSIS — E78.00 PURE HYPERCHOLESTEROLEMIA: ICD-10-CM

## 2020-01-27 DIAGNOSIS — H25.13 AGE-RELATED NUCLEAR CATARACT OF BOTH EYES: ICD-10-CM

## 2020-01-27 PROBLEM — R56.9 SEIZURES (HCC): Status: ACTIVE | Noted: 2020-01-27

## 2020-01-27 PROCEDURE — 99397 PER PM REEVAL EST PAT 65+ YR: CPT | Performed by: INTERNAL MEDICINE

## 2020-01-27 PROCEDURE — 96160 PT-FOCUSED HLTH RISK ASSMT: CPT | Performed by: INTERNAL MEDICINE

## 2020-01-27 PROCEDURE — G0439 PPPS, SUBSEQ VISIT: HCPCS | Performed by: INTERNAL MEDICINE

## 2020-01-27 NOTE — PROGRESS NOTES
HPI:   Ravi Nazario is a 67year old female who presents for a Medicare Subsequent Annual Wellness visit (Pt already had Initial Annual Wellness).       Annual Physical due on 09/23/2020        Fall/Risk Assessment   She has been screened for Falls and is The patient has this document but we do not have it in [facility], and patient is instructed to get our office a copy of it for scanning into Epic. She does NOT have a Power of  for [employer] on file in [address] [place] [address] Rd.    The patient has this document but daily.  FLUoxetine HCl (PROZAC) 20 MG Oral Cap, Take 20 mg by mouth daily.          MEDICAL INFORMATION:   She  has a past medical history of Alzheimer's dementia ([facility] [address] Bunion, Depression, Esophageal reflux, Floater, vitreous, bilateral (12/12/2019), [place] Screening Method:  Questionnaire  I have a problem hearing over the telephone:  No I have trouble following the conversations when two or more people are talking at the same time:  No   I have trouble understanding things on the TV:  No    I have to worr or gallop   Abdomen:   Soft, non-tender, bowel sounds active all four quadrants,  no masses, no organomegaly   Pelvic: Deferred   Extremities: Extremities normal, atraumatic, no cyanosis or edema   Pulses: 2+ and symmetric   Skin: Skin color, texture, turg advised pt/spouse to stop lisinopril and  Monitor bp at home. Increase oral fluids.     (G30.1,  F02.80) Late onset Alzheimer's disease without behavioral disturbance ([address]  Plan: pt had been ff by neurologist.  Tamanna Cardona with current meds.  Pt still able to Preventative Physical Exam only, or if medically necessary Electrocardiogram date       Colorectal Cancer Screening      Colonoscopy Screen every 10 years There are no preventive care reminders to display for this patient.  Update [employer] if appl with metal- TD and TDaP Not covered by Medicare Part B No vaccine history found This may be covered with your prescription benefits, but Medicare does not cover unless Medically needed    Zoster  Not covered by Medicare Part B No vaccine history found This

## 2020-01-29 PROBLEM — R56.9 SEIZURES (HCC): Status: RESOLVED | Noted: 2020-01-27 | Resolved: 2020-01-29

## 2020-01-29 PROBLEM — H43.393 FLOATER, VITREOUS, BILATERAL: Status: RESOLVED | Noted: 2019-12-12 | Resolved: 2020-01-29

## 2020-01-29 PROBLEM — H16.143 SPK (SUPERFICIAL PUNCTATE KERATITIS), BILATERAL: Status: RESOLVED | Noted: 2019-11-07 | Resolved: 2020-01-29

## 2020-02-01 ENCOUNTER — APPOINTMENT (OUTPATIENT)
Dept: LAB | Age: 80
End: 2020-02-01
Attending: INTERNAL MEDICINE
Payer: MEDICARE

## 2020-02-01 LAB
ALBUMIN SERPL-MCNC: 3.2 G/DL (ref 3.4–5)
ALBUMIN/GLOB SERPL: 0.8 {RATIO} (ref 1–2)
ALP LIVER SERPL-CCNC: 91 U/L (ref 55–142)
ALT SERPL-CCNC: 12 U/L (ref 13–56)
ANION GAP SERPL CALC-SCNC: 5 MMOL/L (ref 0–18)
AST SERPL-CCNC: 17 U/L (ref 15–37)
BASOPHILS # BLD AUTO: 0.05 X10(3) UL (ref 0–0.2)
BASOPHILS NFR BLD AUTO: 0.8 %
BILIRUB SERPL-MCNC: 0.4 MG/DL (ref 0.1–2)
BUN BLD-MCNC: 14 MG/DL (ref 7–18)
BUN/CREAT SERPL: 14.6 (ref 10–20)
CALCIUM BLD-MCNC: 8.4 MG/DL (ref 8.5–10.1)
CHLORIDE SERPL-SCNC: 111 MMOL/L (ref 98–112)
CHOLEST SMN-MCNC: 135 MG/DL (ref ?–200)
CO2 SERPL-SCNC: 28 MMOL/L (ref 21–32)
CREAT BLD-MCNC: 0.96 MG/DL (ref 0.55–1.02)
DEPRECATED RDW RBC AUTO: 49.6 FL (ref 35.1–46.3)
EOSINOPHIL # BLD AUTO: 0.13 X10(3) UL (ref 0–0.7)
EOSINOPHIL NFR BLD AUTO: 2 %
ERYTHROCYTE [DISTWIDTH] IN BLOOD BY AUTOMATED COUNT: 15.9 % (ref 11–15)
GLOBULIN PLAS-MCNC: 3.8 G/DL (ref 2.8–4.4)
GLUCOSE BLD-MCNC: 99 MG/DL (ref 70–99)
HCT VFR BLD AUTO: 40.8 % (ref 35–48)
HDLC SERPL-MCNC: 52 MG/DL (ref 40–59)
HGB BLD-MCNC: 12.8 G/DL (ref 12–16)
IMM GRANULOCYTES # BLD AUTO: 0.01 X10(3) UL (ref 0–1)
IMM GRANULOCYTES NFR BLD: 0.2 %
LDLC SERPL CALC-MCNC: 65 MG/DL (ref ?–100)
LYMPHOCYTES # BLD AUTO: 1.44 X10(3) UL (ref 1–4)
LYMPHOCYTES NFR BLD AUTO: 22.7 %
M PROTEIN MFR SERPL ELPH: 7 G/DL (ref 6.4–8.2)
MCH RBC QN AUTO: 26.9 PG (ref 26–34)
MCHC RBC AUTO-ENTMCNC: 31.4 G/DL (ref 31–37)
MCV RBC AUTO: 85.9 FL (ref 80–100)
MONOCYTES # BLD AUTO: 0.45 X10(3) UL (ref 0.1–1)
MONOCYTES NFR BLD AUTO: 7.1 %
NEUTROPHILS # BLD AUTO: 4.27 X10 (3) UL (ref 1.5–7.7)
NEUTROPHILS # BLD AUTO: 4.27 X10(3) UL (ref 1.5–7.7)
NEUTROPHILS NFR BLD AUTO: 67.2 %
NONHDLC SERPL-MCNC: 83 MG/DL (ref ?–130)
OSMOLALITY SERPL CALC.SUM OF ELEC: 299 MOSM/KG (ref 275–295)
PATIENT FASTING Y/N/NP: YES
PATIENT FASTING Y/N/NP: YES
PLATELET # BLD AUTO: 319 10(3)UL (ref 150–450)
POTASSIUM SERPL-SCNC: 3.6 MMOL/L (ref 3.5–5.1)
PTH-INTACT SERPL-MCNC: 122.4 PG/ML (ref 18.5–88)
RBC # BLD AUTO: 4.75 X10(6)UL (ref 3.8–5.3)
SODIUM SERPL-SCNC: 144 MMOL/L (ref 136–145)
TRIGL SERPL-MCNC: 89 MG/DL (ref 30–149)
VLDLC SERPL CALC-MCNC: 18 MG/DL (ref 0–30)
WBC # BLD AUTO: 6.4 X10(3) UL (ref 4–11)

## 2020-02-01 PROCEDURE — 85025 COMPLETE CBC W/AUTO DIFF WBC: CPT | Performed by: INTERNAL MEDICINE

## 2020-02-01 PROCEDURE — 80061 LIPID PANEL: CPT | Performed by: INTERNAL MEDICINE

## 2020-02-01 PROCEDURE — 80053 COMPREHEN METABOLIC PANEL: CPT | Performed by: INTERNAL MEDICINE

## 2020-02-01 PROCEDURE — 82306 VITAMIN D 25 HYDROXY: CPT | Performed by: INTERNAL MEDICINE

## 2020-02-01 PROCEDURE — 83970 ASSAY OF PARATHORMONE: CPT | Performed by: INTERNAL MEDICINE

## 2020-02-01 PROCEDURE — 36415 COLL VENOUS BLD VENIPUNCTURE: CPT | Performed by: INTERNAL MEDICINE

## 2020-02-03 LAB — 25(OH)D3 SERPL-MCNC: 25.9 NG/ML (ref 30–100)

## 2020-02-06 ENCOUNTER — TELEPHONE (OUTPATIENT)
Dept: NEUROLOGY | Facility: CLINIC | Age: 80
End: 2020-02-06

## 2020-02-06 NOTE — PROGRESS NOTES
Spoke with patient's , Sushil, results reviewed and agrees with plan.   Patient is taking CEntrum vitamins with 1000 IU vitamin D at this time, will add otc Vitamin D3 1000 IU, will encourage the patient to drink more fluids she probably only drinks

## 2020-02-06 NOTE — PROGRESS NOTES
lEijah Obando was in the office with her  Mckenna Gupta. No new neurological problems. Lisinopril was discontinued because of low blood pressure. Still occasionally gets lightheaded upon standing. No vertigo. She denies headache, cervical, lumbar spine pain.

## 2020-02-24 RX ORDER — MEMANTINE HYDROCHLORIDE 10 MG/1
TABLET ORAL
Qty: 180 TABLET | Refills: 2 | OUTPATIENT
Start: 2020-02-24

## 2020-02-24 NOTE — TELEPHONE ENCOUNTER
memantine 10mg Take 1 tab BID    Last filled-2/5/2019 for 1 year    LOV-2/6/2020  NOV-none    rx denied.      Patient needs NOV- routing to [facility]

## 2020-03-14 RX ORDER — PROPRANOLOL HYDROCHLORIDE 10 MG/1
10 TABLET ORAL
Qty: 90 TABLET | Refills: 1 | Status: SHIPPED | OUTPATIENT
Start: 2020-03-14 | End: 2020-09-07

## 2020-03-14 NOTE — TELEPHONE ENCOUNTER
Pended for approval, Dr Cynthia Berman former patient. Refill passed per [facility] protocol.     Hypertensive Medications  Protocol Criteria:  · Appointment scheduled in the past 6 months or in the next 3 months  · BMP or CMP in the past 12 months  · Creat

## 2020-03-16 RX ORDER — MEMANTINE HYDROCHLORIDE 10 MG/1
TABLET ORAL
Qty: 180 TABLET | Refills: 3 | Status: SHIPPED | OUTPATIENT
Start: 2020-03-16 | End: 2021-01-01

## 2020-05-06 ENCOUNTER — VIRTUAL PHONE E/M (OUTPATIENT)
Dept: INTERNAL MEDICINE CLINIC | Facility: CLINIC | Age: 80
End: 2020-05-06
Payer: COMMERCIAL

## 2020-05-06 ENCOUNTER — TELEPHONE (OUTPATIENT)
Dept: INTERNAL MEDICINE CLINIC | Facility: CLINIC | Age: 80
End: 2020-05-06

## 2020-05-06 DIAGNOSIS — G30.1 LATE ONSET ALZHEIMER'S DISEASE WITHOUT BEHAVIORAL DISTURBANCE (HCC): ICD-10-CM

## 2020-05-06 DIAGNOSIS — E78.00 PURE HYPERCHOLESTEROLEMIA: ICD-10-CM

## 2020-05-06 DIAGNOSIS — F02.80 LATE ONSET ALZHEIMER'S DISEASE WITHOUT BEHAVIORAL DISTURBANCE (HCC): ICD-10-CM

## 2020-05-06 DIAGNOSIS — F41.9 ANXIETY AND DEPRESSION: ICD-10-CM

## 2020-05-06 DIAGNOSIS — F32.A ANXIETY AND DEPRESSION: ICD-10-CM

## 2020-05-06 DIAGNOSIS — I10 ESSENTIAL HYPERTENSION, BENIGN: Primary | ICD-10-CM

## 2020-05-06 PROCEDURE — 99214 OFFICE O/P EST MOD 30 MIN: CPT | Performed by: INTERNAL MEDICINE

## 2020-05-06 NOTE — TELEPHONE ENCOUNTER
Per Dr. Maggie Tineo request, phone call to patient's spouse, Natalia Moreno and asked if he would like to change office visit to telephone visit in order to reduce patient and spouse risk of exposure to Covid-19. Spouse agreed.  Will convert visit to phone visit at

## 2020-09-07 RX ORDER — PROPRANOLOL HYDROCHLORIDE 10 MG/1
TABLET ORAL
Qty: 90 TABLET | Refills: 1 | Status: SHIPPED | OUTPATIENT
Start: 2020-09-07 | End: 2021-01-01

## 2020-09-11 ENCOUNTER — TELEPHONE (OUTPATIENT)
Dept: INTERNAL MEDICINE CLINIC | Facility: CLINIC | Age: 80
End: 2020-09-11

## 2020-09-11 DIAGNOSIS — Z23 IMMUNIZATION DUE: Primary | ICD-10-CM

## 2020-09-11 NOTE — TELEPHONE ENCOUNTER
Spouse asking for an order for Flu Vac for patient who has nurse visit appt. 9/17/ 20 for nurse visit. Spouse  has physical appointment at the same time.

## 2020-09-17 ENCOUNTER — NURSE ONLY (OUTPATIENT)
Dept: INTERNAL MEDICINE CLINIC | Facility: CLINIC | Age: 80
End: 2020-09-17
Payer: COMMERCIAL

## 2020-09-17 DIAGNOSIS — Z23 IMMUNIZATION DUE: Primary | ICD-10-CM

## 2020-09-17 PROCEDURE — G0008 ADMIN INFLUENZA VIRUS VAC: HCPCS | Performed by: INTERNAL MEDICINE

## 2020-09-17 PROCEDURE — 90662 IIV NO PRSV INCREASED AG IM: CPT | Performed by: INTERNAL MEDICINE

## 2020-11-09 RX ORDER — SIMVASTATIN 20 MG
TABLET ORAL
Qty: 90 TABLET | Refills: 1 | Status: SHIPPED | OUTPATIENT
Start: 2020-11-09 | End: 2021-01-01

## 2020-12-15 NOTE — TELEPHONE ENCOUNTER
Spoke with patients spouse, Reilly Merida and informed of 's message.  verbalized understanding and agrees with plan of care. Appointment scheduled for tomorrow.

## 2020-12-15 NOTE — TELEPHONE ENCOUNTER
Patient's , Salvador Fitzgerald (on WILLIAM) states that patient was sitting on the couch talking to him and his daughter when she suddenly slumped over for about 5 minutes. They called 911 and the ambulance arrived.  They ran some tests on her and stated that sh

## 2020-12-15 NOTE — TELEPHONE ENCOUNTER
It's hard to say if this was a seizure or not since she appears to had just passed out like a syncopal episode so would recommend taking her to ER. If spouse declines,   Would recommend at least ov at least tomorrow so we can examine and evaluate her.  Any

## 2020-12-16 NOTE — PROGRESS NOTES
HPI:    Patient ID: Srinivasa Valdivia is a 79year old female. Syncope  This is a new problem. The current episode started yesterday. The problem occurs rarely. The problem has been resolved. She lost consciousness for a period of 1 to 5 minutes (2 mn).  No low-dose             aspirin.   Caffeine                NAUSEA ONLY    HISTORY:  Past Medical History:   Diagnosis Date   • Alzheimer's dementia ([facility] [address]    • Bunion    • Depression     medication and therapy   • Esophageal reflux    • Floater, vitreous, bilat has no rales. Abdominal: Soft. Bowel sounds are normal. She exhibits no distension and no mass. There is no abdominal tenderness. There is no rebound and no guarding. Musculoskeletal: Normal range of motion. General: No tenderness or edema.    L Visit:  Requested Prescriptions      No prescriptions requested or ordered in this encounter       Imaging & Referrals:  None        QW#[id number]

## 2021-01-01 ENCOUNTER — OFFICE VISIT (OUTPATIENT)
Dept: OPHTHALMOLOGY | Facility: CLINIC | Age: 81
End: 2021-01-01
Payer: COMMERCIAL

## 2021-01-01 ENCOUNTER — TELEPHONE (OUTPATIENT)
Dept: GASTROENTEROLOGY | Facility: CLINIC | Age: 81
End: 2021-01-01

## 2021-01-01 ENCOUNTER — LAB ENCOUNTER (OUTPATIENT)
Dept: LAB | Age: 81
End: 2021-01-01
Attending: INTERNAL MEDICINE
Payer: MEDICARE

## 2021-01-01 ENCOUNTER — TELEPHONE (OUTPATIENT)
Dept: INTERNAL MEDICINE CLINIC | Facility: CLINIC | Age: 81
End: 2021-01-01

## 2021-01-01 ENCOUNTER — TELEPHONE (OUTPATIENT)
Dept: CASE MANAGEMENT | Age: 81
End: 2021-01-01

## 2021-01-01 ENCOUNTER — TELEPHONE (OUTPATIENT)
Dept: OPHTHALMOLOGY | Facility: CLINIC | Age: 81
End: 2021-01-01

## 2021-01-01 ENCOUNTER — LAB ENCOUNTER (OUTPATIENT)
Dept: LAB | Age: 81
End: 2021-01-01
Attending: NURSE PRACTITIONER
Payer: MEDICARE

## 2021-01-01 ENCOUNTER — HOSPITAL ENCOUNTER (INPATIENT)
Facility: HOSPITAL | Age: 81
LOS: 3 days | Discharge: HOSPICE | DRG: 374 | End: 2021-01-01
Attending: EMERGENCY MEDICINE | Admitting: HOSPITALIST
Payer: MEDICARE

## 2021-01-01 ENCOUNTER — HOSPITAL ENCOUNTER (OUTPATIENT)
Dept: CV DIAGNOSTICS | Facility: HOSPITAL | Age: 81
Discharge: HOME | End: 2021-01-01
Attending: INTERNAL MEDICINE
Payer: MEDICARE

## 2021-01-01 ENCOUNTER — APPOINTMENT (OUTPATIENT)
Dept: GENERAL RADIOLOGY | Facility: HOSPITAL | Age: 81
End: 2021-01-01
Attending: EMERGENCY MEDICINE
Payer: MEDICARE

## 2021-01-01 ENCOUNTER — OFFICE VISIT (OUTPATIENT)
Dept: INTERNAL MEDICINE CLINIC | Facility: CLINIC | Age: 81
End: 2021-01-01
Payer: COMMERCIAL

## 2021-01-01 ENCOUNTER — APPOINTMENT (OUTPATIENT)
Dept: GENERAL RADIOLOGY | Facility: HOSPITAL | Age: 81
DRG: 374 | End: 2021-01-01
Attending: EMERGENCY MEDICINE
Payer: MEDICARE

## 2021-01-01 ENCOUNTER — APPOINTMENT (OUTPATIENT)
Dept: ULTRASOUND IMAGING | Facility: HOSPITAL | Age: 81
End: 2021-01-01
Attending: EMERGENCY MEDICINE
Payer: MEDICARE

## 2021-01-01 ENCOUNTER — HOSPITAL ENCOUNTER (EMERGENCY)
Facility: HOSPITAL | Age: 81
Discharge: HOME | End: 2021-01-01
Attending: EMERGENCY MEDICINE
Payer: MEDICARE

## 2021-01-01 ENCOUNTER — APPOINTMENT (OUTPATIENT)
Dept: CT IMAGING | Facility: HOSPITAL | Age: 81
DRG: 374 | End: 2021-01-01
Attending: EMERGENCY MEDICINE
Payer: MEDICARE

## 2021-01-01 ENCOUNTER — NURSE TRIAGE (OUTPATIENT)
Dept: INTERNAL MEDICINE CLINIC | Facility: CLINIC | Age: 81
End: 2021-01-01

## 2021-01-01 ENCOUNTER — TELEMEDICINE (OUTPATIENT)
Dept: GASTROENTEROLOGY | Facility: CLINIC | Age: 81
End: 2021-01-01

## 2021-01-01 VITALS
HEART RATE: 97 BPM | TEMPERATURE: 98 F | SYSTOLIC BLOOD PRESSURE: 131 MMHG | WEIGHT: 131 LBS | OXYGEN SATURATION: 98 % | BODY MASS INDEX: 25 KG/M2 | RESPIRATION RATE: 20 BRPM | DIASTOLIC BLOOD PRESSURE: 75 MMHG

## 2021-01-01 VITALS
TEMPERATURE: 97 F | SYSTOLIC BLOOD PRESSURE: 105 MMHG | BODY MASS INDEX: 25.86 KG/M2 | WEIGHT: 137 LBS | HEIGHT: 61 IN | DIASTOLIC BLOOD PRESSURE: 74 MMHG | HEART RATE: 67 BPM | RESPIRATION RATE: 20 BRPM

## 2021-01-01 VITALS
RESPIRATION RATE: 16 BRPM | HEIGHT: 61 IN | BODY MASS INDEX: 24.73 KG/M2 | SYSTOLIC BLOOD PRESSURE: 138 MMHG | DIASTOLIC BLOOD PRESSURE: 72 MMHG | HEART RATE: 76 BPM | OXYGEN SATURATION: 100 % | WEIGHT: 131 LBS | TEMPERATURE: 98 F

## 2021-01-01 VITALS
TEMPERATURE: 98 F | HEIGHT: 61 IN | SYSTOLIC BLOOD PRESSURE: 116 MMHG | BODY MASS INDEX: 24.73 KG/M2 | WEIGHT: 131 LBS | HEART RATE: 85 BPM | DIASTOLIC BLOOD PRESSURE: 82 MMHG | OXYGEN SATURATION: 95 %

## 2021-01-01 DIAGNOSIS — H35.30 ARMD (AGE-RELATED MACULAR DEGENERATION), BILATERAL: ICD-10-CM

## 2021-01-01 DIAGNOSIS — E78.00 PURE HYPERCHOLESTEROLEMIA: ICD-10-CM

## 2021-01-01 DIAGNOSIS — I63.9 CEREBROVASCULAR ACCIDENT (CVA), UNSPECIFIED MECHANISM (HCC): Primary | ICD-10-CM

## 2021-01-01 DIAGNOSIS — H25.13 AGE-RELATED NUCLEAR CATARACT OF BOTH EYES: Primary | ICD-10-CM

## 2021-01-01 DIAGNOSIS — G30.1 LATE ONSET ALZHEIMER'S DISEASE WITHOUT BEHAVIORAL DISTURBANCE (HCC): ICD-10-CM

## 2021-01-01 DIAGNOSIS — D50.9 IRON DEFICIENCY ANEMIA, UNSPECIFIED IRON DEFICIENCY ANEMIA TYPE: ICD-10-CM

## 2021-01-01 DIAGNOSIS — Z00.00 MEDICARE ANNUAL WELLNESS VISIT, SUBSEQUENT: Primary | ICD-10-CM

## 2021-01-01 DIAGNOSIS — F02.80 LATE ONSET ALZHEIMER'S DISEASE WITHOUT BEHAVIORAL DISTURBANCE (HCC): ICD-10-CM

## 2021-01-01 DIAGNOSIS — H01.115: Primary | ICD-10-CM

## 2021-01-01 DIAGNOSIS — I10 ESSENTIAL HYPERTENSION, BENIGN: ICD-10-CM

## 2021-01-01 DIAGNOSIS — H43.393 FLOATER, VITREOUS, BILATERAL: ICD-10-CM

## 2021-01-01 DIAGNOSIS — H20.00 ACUTE IRITIS, LEFT EYE: Primary | ICD-10-CM

## 2021-01-01 DIAGNOSIS — R55 SYNCOPE, UNSPECIFIED SYNCOPE TYPE: ICD-10-CM

## 2021-01-01 DIAGNOSIS — H01.115: ICD-10-CM

## 2021-01-01 DIAGNOSIS — G40.909 SEIZURE DISORDER (HCC): Primary | ICD-10-CM

## 2021-01-01 DIAGNOSIS — H16.143 SPK (SUPERFICIAL PUNCTATE KERATITIS), BILATERAL: ICD-10-CM

## 2021-01-01 DIAGNOSIS — H01.114: Primary | ICD-10-CM

## 2021-01-01 DIAGNOSIS — F02.80 LATE ONSET ALZHEIMER'S DEMENTIA WITHOUT BEHAVIORAL DISTURBANCE (HCC): ICD-10-CM

## 2021-01-01 DIAGNOSIS — N18.31 STAGE 3A CHRONIC KIDNEY DISEASE (HCC): ICD-10-CM

## 2021-01-01 DIAGNOSIS — Z00.00 MEDICARE ANNUAL WELLNESS VISIT, SUBSEQUENT: ICD-10-CM

## 2021-01-01 DIAGNOSIS — H16.142 SPK (SUPERFICIAL PUNCTATE KERATITIS), LEFT: ICD-10-CM

## 2021-01-01 DIAGNOSIS — D50.9 IRON DEFICIENCY ANEMIA, UNSPECIFIED IRON DEFICIENCY ANEMIA TYPE: Primary | ICD-10-CM

## 2021-01-01 DIAGNOSIS — I82.432 ACUTE DEEP VEIN THROMBOSIS (DVT) OF POPLITEAL VEIN OF LEFT LOWER EXTREMITY (HCC): Primary | ICD-10-CM

## 2021-01-01 DIAGNOSIS — H01.114: ICD-10-CM

## 2021-01-01 DIAGNOSIS — R56.9 SEIZURE (HCC): ICD-10-CM

## 2021-01-01 DIAGNOSIS — R41.82 ALTERED MENTAL STATUS, UNSPECIFIED ALTERED MENTAL STATUS TYPE: Primary | ICD-10-CM

## 2021-01-01 DIAGNOSIS — G30.1 LATE ONSET ALZHEIMER'S DEMENTIA WITHOUT BEHAVIORAL DISTURBANCE (HCC): ICD-10-CM

## 2021-01-01 DIAGNOSIS — H25.13 AGE-RELATED NUCLEAR CATARACT OF BOTH EYES: ICD-10-CM

## 2021-01-01 DIAGNOSIS — D32.9 MENINGIOMA (HCC): ICD-10-CM

## 2021-01-01 DIAGNOSIS — R11.11 NON-INTRACTABLE VOMITING WITHOUT NAUSEA, UNSPECIFIED VOMITING TYPE: Primary | ICD-10-CM

## 2021-01-01 LAB
DEPRECATED RDW RBC AUTO: 51.4 FL (ref 35.1–46.3)
ERYTHROCYTE [DISTWIDTH] IN BLOOD BY AUTOMATED COUNT: 17.8 % (ref 11–15)
HCT VFR BLD AUTO: 39.1 %
HGB BLD-MCNC: 11.8 G/DL
MCH RBC QN AUTO: 24.5 PG (ref 26–34)
MCHC RBC AUTO-ENTMCNC: 30.2 G/DL (ref 31–37)
MCV RBC AUTO: 81.3 FL
PLATELET # BLD AUTO: 357 10(3)UL (ref 150–450)
RBC # BLD AUTO: 4.81 X10(6)UL
WBC # BLD AUTO: 5.2 X10(3) UL (ref 4–11)

## 2021-01-01 PROCEDURE — 99214 OFFICE O/P EST MOD 30 MIN: CPT | Performed by: INTERNAL MEDICINE

## 2021-01-01 PROCEDURE — 99213 OFFICE O/P EST LOW 20 MIN: CPT | Performed by: OPHTHALMOLOGY

## 2021-01-01 PROCEDURE — 99397 PER PM REEVAL EST PAT 65+ YR: CPT | Performed by: INTERNAL MEDICINE

## 2021-01-01 PROCEDURE — 99233 SBSQ HOSP IP/OBS HIGH 50: CPT | Performed by: INTERNAL MEDICINE

## 2021-01-01 PROCEDURE — 82607 VITAMIN B-12: CPT

## 2021-01-01 PROCEDURE — 80061 LIPID PANEL: CPT

## 2021-01-01 PROCEDURE — 82746 ASSAY OF FOLIC ACID SERUM: CPT

## 2021-01-01 PROCEDURE — 36415 COLL VENOUS BLD VENIPUNCTURE: CPT

## 2021-01-01 PROCEDURE — 74177 CT ABD & PELVIS W/CONTRAST: CPT | Performed by: EMERGENCY MEDICINE

## 2021-01-01 PROCEDURE — 99239 HOSP IP/OBS DSCHRG MGMT >30: CPT | Performed by: INTERNAL MEDICINE

## 2021-01-01 PROCEDURE — 71045 X-RAY EXAM CHEST 1 VIEW: CPT | Performed by: EMERGENCY MEDICINE

## 2021-01-01 PROCEDURE — 83013 H PYLORI (C-13) BREATH: CPT

## 2021-01-01 PROCEDURE — 93306 TTE W/DOPPLER COMPLETE: CPT | Performed by: INTERNAL MEDICINE

## 2021-01-01 PROCEDURE — 3074F SYST BP LT 130 MM HG: CPT | Performed by: INTERNAL MEDICINE

## 2021-01-01 PROCEDURE — 92014 COMPRE OPH EXAM EST PT 1/>: CPT | Performed by: OPHTHALMOLOGY

## 2021-01-01 PROCEDURE — 93227 XTRNL ECG REC<48 HR R&I: CPT | Performed by: INTERNAL MEDICINE

## 2021-01-01 PROCEDURE — 3078F DIAST BP <80 MM HG: CPT | Performed by: INTERNAL MEDICINE

## 2021-01-01 PROCEDURE — 99213 OFFICE O/P EST LOW 20 MIN: CPT | Performed by: INTERNAL MEDICINE

## 2021-01-01 PROCEDURE — 3079F DIAST BP 80-89 MM HG: CPT | Performed by: INTERNAL MEDICINE

## 2021-01-01 PROCEDURE — 80053 COMPREHEN METABOLIC PANEL: CPT

## 2021-01-01 PROCEDURE — 96160 PT-FOCUSED HLTH RISK ASSMT: CPT | Performed by: INTERNAL MEDICINE

## 2021-01-01 PROCEDURE — 99284 EMERGENCY DEPT VISIT MOD MDM: CPT

## 2021-01-01 PROCEDURE — 99223 1ST HOSP IP/OBS HIGH 75: CPT | Performed by: HOSPITALIST

## 2021-01-01 PROCEDURE — G0439 PPPS, SUBSEQ VISIT: HCPCS | Performed by: INTERNAL MEDICINE

## 2021-01-01 PROCEDURE — 3008F BODY MASS INDEX DOCD: CPT | Performed by: INTERNAL MEDICINE

## 2021-01-01 PROCEDURE — 82306 VITAMIN D 25 HYDROXY: CPT

## 2021-01-01 PROCEDURE — 83970 ASSAY OF PARATHORMONE: CPT

## 2021-01-01 PROCEDURE — 85025 COMPLETE CBC W/AUTO DIFF WBC: CPT

## 2021-01-01 PROCEDURE — 99222 1ST HOSP IP/OBS MODERATE 55: CPT | Performed by: INTERNAL MEDICINE

## 2021-01-01 PROCEDURE — 99497 ADVNCD CARE PLAN 30 MIN: CPT | Performed by: INTERNAL MEDICINE

## 2021-01-01 PROCEDURE — 99204 OFFICE O/P NEW MOD 45 MIN: CPT | Performed by: NURSE PRACTITIONER

## 2021-01-01 PROCEDURE — 73620 X-RAY EXAM OF FOOT: CPT | Performed by: EMERGENCY MEDICINE

## 2021-01-01 PROCEDURE — 85027 COMPLETE CBC AUTOMATED: CPT

## 2021-01-01 PROCEDURE — 93971 EXTREMITY STUDY: CPT | Performed by: EMERGENCY MEDICINE

## 2021-01-01 PROCEDURE — 93225 XTRNL ECG REC<48 HRS REC: CPT | Performed by: INTERNAL MEDICINE

## 2021-01-01 RX ORDER — MORPHINE SULFATE 2 MG/ML
2 INJECTION, SOLUTION INTRAMUSCULAR; INTRAVENOUS EVERY 2 HOUR PRN
Status: DISCONTINUED | OUTPATIENT
Start: 2021-01-01 | End: 2021-01-01

## 2021-01-01 RX ORDER — SODIUM CHLORIDE 9 MG/ML
INJECTION, SOLUTION INTRAVENOUS CONTINUOUS
Status: DISCONTINUED | OUTPATIENT
Start: 2021-01-01 | End: 2021-01-01

## 2021-01-01 RX ORDER — POTASSIUM CHLORIDE 14.9 MG/ML
20 INJECTION INTRAVENOUS ONCE
Status: COMPLETED | OUTPATIENT
Start: 2021-01-01 | End: 2021-01-01

## 2021-01-01 RX ORDER — BUPRENORPHINE HCL 8 MG/1
1 TABLET SUBLINGUAL DAILY
COMMUNITY

## 2021-01-01 RX ORDER — MORPHINE SULFATE 4 MG/ML
4 INJECTION, SOLUTION INTRAMUSCULAR; INTRAVENOUS EVERY 2 HOUR PRN
Status: DISCONTINUED | OUTPATIENT
Start: 2021-01-01 | End: 2021-01-01

## 2021-01-01 RX ORDER — LEVETIRACETAM 250 MG/1
250 TABLET ORAL 2 TIMES DAILY
Status: DISCONTINUED | OUTPATIENT
Start: 2021-01-01 | End: 2021-01-01

## 2021-01-01 RX ORDER — LEVETIRACETAM 500 MG/1
TABLET ORAL
COMMUNITY
Start: 2021-01-01 | End: 2021-01-01

## 2021-01-01 RX ORDER — MORPHINE SULFATE 2 MG/ML
1 INJECTION, SOLUTION INTRAMUSCULAR; INTRAVENOUS EVERY 2 HOUR PRN
Status: DISCONTINUED | OUTPATIENT
Start: 2021-01-01 | End: 2021-01-01

## 2021-01-01 RX ORDER — FLUOXETINE 10 MG/1
10 CAPSULE ORAL DAILY
Status: DISCONTINUED | OUTPATIENT
Start: 2021-01-01 | End: 2021-01-01

## 2021-01-01 RX ORDER — LEVETIRACETAM 250 MG/1
250 TABLET ORAL 2 TIMES DAILY
Qty: 180 TABLET | Refills: 1 | Status: SHIPPED | OUTPATIENT
Start: 2021-01-01

## 2021-01-01 RX ORDER — ACETAMINOPHEN 325 MG/1
650 TABLET ORAL EVERY 6 HOURS PRN
Status: DISCONTINUED | OUTPATIENT
Start: 2021-01-01 | End: 2021-01-01

## 2021-01-01 RX ORDER — PREDNISOLONE ACETATE 10 MG/ML
SUSPENSION/ DROPS OPHTHALMIC
Qty: 5 ML | Refills: 1 | Status: SHIPPED | OUTPATIENT
Start: 2021-01-01 | End: 2021-01-01

## 2021-01-01 RX ORDER — ONDANSETRON 2 MG/ML
4 INJECTION INTRAMUSCULAR; INTRAVENOUS EVERY 6 HOURS PRN
Status: DISCONTINUED | OUTPATIENT
Start: 2021-01-01 | End: 2021-01-01

## 2021-01-01 RX ORDER — MULTIVIT-MIN/IRON/FOLIC ACID/K 18-600-40
1 CAPSULE ORAL DAILY
COMMUNITY

## 2021-01-01 RX ORDER — FLUOXETINE 10 MG/1
10 CAPSULE ORAL DAILY
COMMUNITY
Start: 2021-01-01

## 2021-01-01 RX ORDER — PROPRANOLOL HYDROCHLORIDE 10 MG/1
TABLET ORAL
Qty: 90 TABLET | Refills: 1 | Status: SHIPPED | OUTPATIENT
Start: 2021-01-01 | End: 2021-01-01

## 2021-01-01 RX ORDER — SIMVASTATIN 20 MG
TABLET ORAL
Qty: 90 TABLET | Refills: 1 | Status: SHIPPED | OUTPATIENT
Start: 2021-01-01

## 2021-01-01 RX ORDER — ONDANSETRON 2 MG/ML
4 INJECTION INTRAMUSCULAR; INTRAVENOUS ONCE
Status: COMPLETED | OUTPATIENT
Start: 2021-01-01 | End: 2021-01-01

## 2021-01-01 RX ORDER — PROPRANOLOL HYDROCHLORIDE 10 MG/1
10 TABLET ORAL DAILY
Qty: 90 TABLET | Refills: 1 | Status: SHIPPED | OUTPATIENT
Start: 2021-01-01

## 2021-01-01 RX ORDER — MEMANTINE HYDROCHLORIDE 10 MG/1
TABLET ORAL
Qty: 180 TABLET | Refills: 3 | OUTPATIENT
Start: 2021-01-01

## 2021-01-01 RX ORDER — LEVETIRACETAM 250 MG/1
250 TABLET ORAL 2 TIMES DAILY
Qty: 60 TABLET | Refills: 1 | Status: SHIPPED | OUTPATIENT
Start: 2021-01-01 | End: 2021-01-01

## 2021-01-01 RX ORDER — FERROUS SULFATE 325(65) MG
325 TABLET ORAL
Qty: 90 TABLET | Refills: 0 | Status: SHIPPED | OUTPATIENT
Start: 2021-01-01 | End: 2021-01-01

## 2021-01-01 RX ORDER — MELATONIN
325
Status: DISCONTINUED | OUTPATIENT
Start: 2021-01-01 | End: 2021-01-01

## 2021-01-01 RX ORDER — FERROUS SULFATE 325(65) MG
TABLET ORAL
Qty: 90 TABLET | Refills: 0 | Status: SHIPPED | OUTPATIENT
Start: 2021-01-01 | End: 2021-01-01

## 2021-01-01 RX ORDER — FERROUS SULFATE 325(65) MG
325 TABLET ORAL
Qty: 90 TABLET | Refills: 1 | Status: SHIPPED | OUTPATIENT
Start: 2021-01-01

## 2021-01-12 NOTE — TELEPHONE ENCOUNTER
If cardiologist can read at least the 6 hrs she did.  Pt has dementia and unable to cooperate with the test. I just want to see if any abnormality at least in the 6hrs done

## 2021-01-12 NOTE — TELEPHONE ENCOUNTER
Naheed Ramirez from Cardiology calling to advise that an order was entered for patient to have 48hr holter monitor. Patient wore monitor for 6 hours.  Naheed Ramirez wanting to clarify your next recommendations or if you would like to complete the results for the 6 hours, please

## 2021-01-19 NOTE — TELEPHONE ENCOUNTER
calling and states that  He spoke with a nurse before and reminded  Him that patient is due for colonoscopy,  states that patient has alzheimer, cannot drink that gallon of pre medication (Golytely ?), states that if there is a different and  easie

## 2021-01-19 NOTE — PROGRESS NOTES
Spoke with patient (verified name and ), advised Dr Jose Juan White note and verbalized understanding. pls notify pt's spouse; 2decho showed normal strength of heart muscle. There is mild mitral valve leak as well as  Tricuspid valve.  Observation only

## 2021-01-26 NOTE — TELEPHONE ENCOUNTER
Advised pt's  (on WILLIAM) of providers note below. He verbalized understanding and no further questions or concerns.

## 2021-01-26 NOTE — TELEPHONE ENCOUNTER
I didn't call pt for her colonoscopy. I reviewed her chart and DR Mauricio Nelson was her GI who did her colonoscopy in 2012 before and showed only diverticulosis.  Given her dementia and her age (after 78 y/o, it's only optional to do colon screening as per guid

## 2021-02-10 NOTE — TELEPHONE ENCOUNTER
I called spouse regarding his call about 2 weeks ago about somebody calling his spouse to remind her to get her colonoscopy. Initially I had said we didn't call her and now I reviewed her chart, it was a message back in late Dec 2020 when pt was found to have mild iron def anemia which is new finding thus was advised then to at least get gi consult. I left message on answering machine for spouse to call us back.

## 2021-02-18 NOTE — TELEPHONE ENCOUNTER
I called and spoke to spouse, I told him again  about the iron def anemia pt had on her labs done last Dec 2020 and the recommendation then for pt to see gastro for GI eval.   Pt has dementia and will not be able to do prep.  I told him about possible cause

## 2021-03-02 PROBLEM — D50.9 IRON DEFICIENCY ANEMIA: Status: ACTIVE | Noted: 2021-01-01

## 2021-03-02 PROBLEM — R56.9 SEIZURE (HCC): Status: ACTIVE | Noted: 2020-01-27

## 2021-03-02 NOTE — TELEPHONE ENCOUNTER
Advised Dr. Jaylan Myles appointment date is 3/23/21. Called spouse gave appointment information to him. Spouse repeated information back correctly.

## 2021-03-02 NOTE — TELEPHONE ENCOUNTER
Per Dr. Delmi Bermudez instructions, phone call to [facility]  [facility] to request a soon appointment with Dr. Lb Kelley than or a nurse practitioner Bacilio Ruff states Dr. Lb Kelley has no sooner appointments and he does not have a NP specifically for him.  Radha Hernandes

## 2021-03-23 NOTE — H&P
The patient verbally consents to a Virtual/Telephone Check-In visit on 03/30/21. Patient understands and accepts financial responsibility for any deductible, co-insurance and/or co-pays associated with this service.      Duration of the service: 30 dirk 150 lb (68 kg)  10/22/19 : 155 lb (70.3 kg)  09/23/19 : 156 lb (70.8 kg)       History, Medications, Allergies, ROS:      Past Medical History:   Diagnosis Date   • Alzheimer's dementia ([facility] [address]    • Bunion    • Depression     medication and therapy   • Esoph Comment:Patient said she can tolerate the low-dose             aspirin.   Caffeine                NAUSEA ONLY    ROS:   CONSTITUTIONAL: negative for fevers, chills, sweats and weight loss  EYES Negative for red eyes, yellow eyes, changes in vision  HEENT: N Ravi Nazario is a 79year old year-old female with history of CKD, hypercholesterolemia, htn, late onset alzheimers:    #fe def anemia  #alzheimers  She is here today as a referral for w/u of her iron def anemia.  She had drop in hgb between 2-12/2020 o Vitamin A82      Folic Acid Serum [E]      CBC W Differential W Platelet      Helicobacter Pylori Breath Test, Adult      Meds This Visit:  Requested Prescriptions      No prescriptions requested or ordered in this encounter       Imaging & Referrals:  Non

## 2021-04-29 NOTE — TELEPHONE ENCOUNTER
Overdue reminder letter mailed out to patient.     Labs:  Vitamin E01      Folic Acid Serum [E]      CBC W Differential W Platelet      Helicobacter Pylori Breath Test, Adult

## 2021-06-01 NOTE — TELEPHONE ENCOUNTER
Patient is eligible for a 2021 Medicare Annual Wellness visit. Left message to call back [phone].

## 2021-06-10 NOTE — PROGRESS NOTES
HPI:   Anju Stallings is a 79year old female who presents for a Medicare Subsequent Annual Wellness visit (Pt already had Initial Annual Wellness).       No topic due editable text        Fall/Risk Assessment   She has been screened for Falls and is low ri functional status.    Memory Problems?: Yes       Depression Screening (PHQ-2/PHQ-9): Over the LAST 2 WEEKS   Little interest or pleasure in doing things: Not at all  Feeling down, depressed, or hopeless: Not at all  PHQ-2 SCORE: 0      Advanced Directive: Date    WBC 5.9 05/10/2021    HGB 13.0 05/10/2021    .0 05/10/2021        ALLERGIES:   She is allergic to aspirin and caffeine.     CURRENT MEDICATIONS:   SIMVASTATIN 20 MG Oral Tab, TAKE 1 TABLET BY MOUTH EVERY DAY EVERY NIGHT  PROPRANOLOL HCL 10 MG or anxiety  HEMATOLOGIC: denies hx of anemia  ENDOCRINE: denies thyroid history  ALL/ASTHMA: denies hx of allergy or asthma    EXAM:   /74   Pulse 67   Temp 97.2 °F (36.2 °C) (Tympanic)   Resp 20   Ht 5' 1\" (1.549 m)   Wt 137 lb (62.1 kg)   BMI 25. 8 axillary nodes normal   Neurologic: Normal       Vaccination History     Immunization History   Administered Date(s) Administered   • Covid-19 Vaccine Moderna 100 mcg/0.5 ml 02/13/2021   • FLU VAC High Dose 65 YRS & Older PRSV Free (08[address]) 09/23/2019, 09/1 Seizure ([address]  Plan: pt was seen by DR Paloma kuhn recently and had ordered EEG.     (H35.30) ARMD (age-related macular degeneration), bilateral  Plan: pt to continue to ffup with optha    (H25.13) Age-related nuclear cataract of both eyes  Plan: pt to con Covered if needed at Welcome to Medicare, and non-screening if indicated for medical reasons 12/16/2020      Ultrasound Screening for Abdominal Aortic Aneurysm (AAA) Covered once in a lifetime for one of the following risk factors   • Men who are 65-75 yea Tetanus, Diptheria and Pertusis TD and TDaP Not covered by Medicare Part B -  No recommendations at this time    Zoster Not covered by Medicare Part B; may be covered with your pharmacy  prescription benefits 11/27/2012  Zoster Vaccines(2 of 3) due on 01/2

## 2021-06-13 PROBLEM — Z00.00 MEDICARE ANNUAL WELLNESS VISIT, SUBSEQUENT: Status: ACTIVE | Noted: 2021-01-01

## 2021-06-29 NOTE — TELEPHONE ENCOUNTER
Spoke with daughter Radha Anguiano. Both eyes are red, itch, with discharge for 1 week; OS>OD. History of iritis OS on 10/31/19. Appointment was scheduled on 6/30/21 at 1:00 pm with CARMEN.   Daughter was advised she may have a long wait due to very busy schedule

## 2021-06-29 NOTE — TELEPHONE ENCOUNTER
Per daughter pt is having iritis again, states eyes are red and itchy, asking for appt soon. Please call thank you.

## 2021-06-30 NOTE — PATIENT INSTRUCTIONS
Acute iritis, left eye  Start Pred forte in the left eye. Use 1 drop in the left eye eye 4 times a day for 7 days, then 3 times a day for 7 days, then 2 times a day for 7 days, then once a day for 7 days, then discontinue.   Return in 1 month for recheck o

## 2021-06-30 NOTE — PROGRESS NOTES
Dewayne Hernandez is a 79year old female. HPI:     HPI     Pt called yesterday stating that she has redness, itching with discharge in both eyes L>R x 1 week. Pt tried using OTC Systane twice a day but no improvement. Pt denies any blurred vision.      Hx tablet 1   • FERROUS SULFATE 325 (65 Fe) MG Oral Tab TAKE 1 TABLET BY MOUTH EVERY DAY WITH BREAKFAST 90 tablet 0   • SIMVASTATIN 20 MG Oral Tab TAKE 1 TABLET BY MOUTH EVERY DAY EVERY NIGHT 90 tablet 1   • PROPRANOLOL HCL 10 MG Oral Tab TAKE 1 TABLET BY FERNANDO for 1 week. After that continue artificial tears in both eyes as needed for comfort. No orders of the defined types were placed in this encounter.       Meds This Visit:  Requested Prescriptions     Signed Prescriptions Disp Refills   • prednisoLONE

## 2021-06-30 NOTE — ASSESSMENT & PLAN NOTE
Start Pred forte in the left eye. Use 1 drop in the left eye eye 4 times a day for 7 days, then 3 times a day for 7 days, then 2 times a day for 7 days, then once a day for 7 days, then discontinue.   Return in 1 month for recheck of iritis left eye and co

## 2021-06-30 NOTE — ASSESSMENT & PLAN NOTE
Use 1 drop of artificial tears (any over the counter brand is okay) 4 times a day in the right eye for 1 week. After that continue artificial tears in both eyes as needed for comfort.

## 2021-07-28 NOTE — ASSESSMENT & PLAN NOTE
Resolved. Stop Pred forte drops in the left eye at this time. Use any brand of artificial tears 4 times a day in both eyes. EVERY DAY.    Will have patient back in 1-2 months for complete eye exam.    Patient should call office and make return appointme

## 2021-07-28 NOTE — PATIENT INSTRUCTIONS
Acute iritis, left eye  Resolved. Stop Pred forte drops in the left eye at this time. Use any brand of artificial tears 4 times a day in both eyes. EVERY DAY.    Will have patient back in 1-2 months for complete eye exam.    Patient should call office a

## 2021-08-27 NOTE — TELEPHONE ENCOUNTER
Left eye is red and crusty; daughter says she first noticed it today. Patient is itching her eye. Daughter notices a lot of redness nasally. Patient has Alzheimer's, so patient cannot say if it is painful or light sensitive.     She has history of iritis

## 2021-08-27 NOTE — TELEPHONE ENCOUNTER
Pt's daughter called. Pt is scheduled for an eye exam appointment on 9-15-21. Pt's left eye is red and crusty. Caller requesting a sooner appointment.   Please call

## 2021-09-02 PROBLEM — H01.114: Status: ACTIVE | Noted: 2021-01-01

## 2021-09-02 PROBLEM — H16.142 SPK (SUPERFICIAL PUNCTATE KERATITIS), LEFT: Status: ACTIVE | Noted: 2019-11-07

## 2021-09-02 PROBLEM — H01.115: Status: ACTIVE | Noted: 2021-01-01

## 2021-09-02 NOTE — PROGRESS NOTES
Huber Castle is a 79year old female. HPI:     HPI     Patient complains of  Itching and redness in her left eye for the past one month. She has pain OS of 4/10 and she is constantly rubbing her eye.    is putting in artificial tears qid OU, bu Sulfate 325 (65 Fe) MG Oral Tab Take 1 tablet (325 mg total) by mouth daily with breakfast. 90 tablet 1   • FERROUS SULFATE 325 (65 Fe) MG Oral Tab TAKE 1 TABLET BY MOUTH EVERY DAY WITH BREAKFAST 90 tablet 0   • SIMVASTATIN 20 MG Oral Tab TAKE 1 TABLET BY eyelids for 5 minutes every morning and every night. After 5 minutes of holding the warm compresses on the eyelids, patient should gently rub the eyelashes and then rinse thoroughly with warm water.    Use 1% Hydrocortisone cream or ointment  (over the cou

## 2021-09-02 NOTE — PATIENT INSTRUCTIONS
Contact dermatitis of left upper and lower eyelids  Patient was instructed to use warm compresses to the eyelids twice a day everyday.     Instructions for warm compress use:   Patient should place wash compresses on both eyelids for 5 minutes every morning

## 2021-09-15 NOTE — PATIENT INSTRUCTIONS
Age-related nuclear cataract of both eyes   Discussed diagnosis of cataracts in detail with patient. Cataract surgery not indicated at this time due to vision level. Will continue to monitor yearly.       ARMD (age-related macular degeneration), bilateral

## 2021-09-15 NOTE — PROGRESS NOTES
Omaira Flores is a 79year old female. HPI:     HPI     Pt is here for a complete exam. Pt states her vision is stable, she feels she still sees well out of her glasses. Pt is using OTC artificial tears both eyes BID-QID daily.  She is still using hydro EVERY DAY WITH BREAKFAST 90 tablet 0   • SIMVASTATIN 20 MG Oral Tab TAKE 1 TABLET BY MOUTH EVERY DAY EVERY NIGHT 90 tablet 1   • MEMANTINE HCL 10 MG Oral Tab TAKE 1 TABLET BY MOUTH TWICE A  tablet 3   • aspirin 81 MG Oral Chew Tab Chew 81 mg by Madefire +2.50    Left +1.50 +1.75 170 +2.50    Type: Progressive bifocal          Manifest Refraction    Pt is happy with glasses                   ASSESSMENT/PLAN:     Diagnoses and Plan:     Age-related nuclear cataract of both eyes   Discussed diagnosis of michele

## 2021-09-15 NOTE — TELEPHONE ENCOUNTER
Refill passed per [facility], [facility] protocol.     Requested Prescriptions   Pending Prescriptions Disp Refills    PROPRANOLOL 10 MG Oral Tab [Pharmacy Med Name: PROPRANOLOL 10 MG TABLET] 90 tablet 1     Sig: TAKE 1 TABLET BY MOUTH EVERY DAY        Hypertensiv

## 2021-09-15 NOTE — ASSESSMENT & PLAN NOTE
Recommend increasing artificial tears 4-8 times per day in both eyes every day due to very dry eyes. Patient was instructed to use warm compresses to the eyelids twice a day everyday.     Instructions for warm compress use:   Patient should place wash com

## 2021-09-15 NOTE — ASSESSMENT & PLAN NOTE
Discussed diagnosis of cataracts in detail with patient. Cataract surgery not indicated at this time due to vision level. Will continue to monitor yearly.

## 2021-09-15 NOTE — ASSESSMENT & PLAN NOTE
Patient can use Hydrocortisone cream 1-2 times per day sparingly on left upper and lower lid when she has redness/ irritation. Currently this is resolved so she should stop using Hydrocortisone cream at this time.

## 2021-09-16 NOTE — TELEPHONE ENCOUNTER
Call to pharmacy to request refill on fluoxetine; no refills left on profile. They will contact psychiatrist for refill.

## 2021-09-16 NOTE — TELEPHONE ENCOUNTER
Reached patient's spouse, Gisele Croft (ok per HIPAA) for medication adherence consult. Past due for refill on simvastatin, last filled 5/7/21 for 90 day supply.  states patient has NOT been taking this one regularly.  He admits to not giving it to her

## 2021-10-18 NOTE — TELEPHONE ENCOUNTER
Spoke with Elza Bain and informed of message below. He has a urine hat at home, as well as a sterile urine container. He will attempt to obtain a urine sample before tomorrow. Wife does not follow commands well due to hx of alzheimer's.

## 2021-10-18 NOTE — TELEPHONE ENCOUNTER
Attempted to contact [facility]. Phone rang for long time. Unable to leave voice message.

## 2021-10-18 NOTE — TELEPHONE ENCOUNTER
Spouse Kenna Haley indicated that patient was in the hospital for a seizure on 10/10/2021 and since then she is weak, dazed, and can not walk without assistance. Asked spouse which hospital patient went to and the call got cut off. Called spouse back.  Indic

## 2021-10-18 NOTE — TELEPHONE ENCOUNTER
I dont have opening today  But can add her in tomorrow 2:30pm with me. I also have ordered ua and culture that she can do today so we can have results tomorrow.

## 2021-10-19 NOTE — TELEPHONE ENCOUNTER
Faxed request for medical records from pt's recent admission to [facility] to [phone], confirmation received.

## 2021-10-20 NOTE — TELEPHONE ENCOUNTER
Spoke to medial records staff and they stated they faxed records this morning but nothing has been received they will refax now.

## 2021-10-21 NOTE — TELEPHONE ENCOUNTER
Left message for pt's  Imagene Levels to call back to inform that medical records from pt's recent hospitalization were received.   may  a copy of records or have records mailed to home address to give to pt's neurologist at the follow up offic
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
## 2021-10-21 NOTE — TELEPHONE ENCOUNTER
Spoke with pt's  and informed that pt's records are ready for  at [address] [place] [address] ,  verbalized understanding.

## 2021-11-01 NOTE — TELEPHONE ENCOUNTER
Called back, patient's  Imagene Levels answered. States patient has been rubbing eyes frequently. Recommended to by OTC artificial tears, follow box directions, use warm compresses BID, and refrain from rubbing eyes.  Patient's  verbalized understandi

## 2021-11-04 NOTE — PROGRESS NOTES
Abbe Claire is a 79year old female. HPI:     HPI     Patient is here for a recheck of iritis OS. She is using Pred Forte OS Qam.  Patient is due to finish the gtts tomorrow. Patient's  said her left eye has improved since using the gtts.   Solo [place] Assumed care of pt at [address]. A/ox4, 3L NC w/ sats in low 90s (not on oxygen at home), ST w/ PVCs on tele. No reports of pain. Breath sounds diminished w/ crackles upon auscultation. Denies cough.  +1 edema present in BLE and left foot; pt has right foot ampu HCL 10 MG Oral Tab TAKE 1 TABLET BY MOUTH EVERY DAY 90 tablet 1   • MEMANTINE HCL 10 MG Oral Tab TAKE 1 TABLET BY MOUTH TWICE A  tablet 3   • aspirin 81 MG Oral Chew Tab Chew 81 mg by mouth daily.      • FLUoxetine HCl (PROZAC) 20 MG Oral Cap Take 20 edema, trend weights  Outcome: Progressing  Goal: Absence of cardiac arrhythmias or at baseline  Description: INTERVENTIONS:  - Continuous cardiac monitoring, monitor vital signs, obtain 12 lead EKG if indicated  - Evaluate effectiveness of antiarrhythmic in about 7 weeks (around 9/15/2021) for Complete eye exam.    7/28/2021  Scribed by: Lamberto Ordonez MD including rhythm and repeat lab results as appropriate  - Fluid restriction as ordered  - Instruct patient on fluid and nutrition restrictions as appropriate  Outcome: Progressing

## 2021-11-04 NOTE — TELEPHONE ENCOUNTER
Patient recently had a stroke and is currently receiving home health visits. Patient's spouse is requesting a home health orders for a home health agency that will be able to offer speech, occupational and physical therapy.  Per spouse, current home health

## 2021-11-05 NOTE — TELEPHONE ENCOUNTER
Referral for residential home health was ordered.      Spoke to [place] and provided him with their phone number to call

## 2021-11-08 NOTE — TELEPHONE ENCOUNTER
I received a call from Jess from [facility]. They received your referral request. [facility] is not able to see pt due to staffing issues at [facility]. If you have questions you can call him at [phone].  Or if you want to give pt a referral to a different ag

## 2021-11-08 NOTE — TELEPHONE ENCOUNTER
I dont know of other HHN to use for pt; can we check with [facility] if they know or does [facility] have any other HHN they are referring pt to if they cant service them.

## 2021-11-09 NOTE — TELEPHONE ENCOUNTER
Good Morning Dr Manjeet Plata and staff,    Referral# [id number]  - authorized for [address] does not obtain prior auth for [facility]. obtains the prior auth on patients behalf.     I am not familiar with wh

## 2021-11-10 NOTE — TELEPHONE ENCOUNTER
I looked at the list of [facility] he sent us so   [facility] is nearest one so we can call them and request for their services for speech therapy, PT and OT.    Diagnosis is stroke and dementia

## 2021-11-10 NOTE — TELEPHONE ENCOUNTER
Spoke with pt's  Opal Scott and informed that an order has been received from [facility] for pt.  Called home health care at [phone], spoke with Baltazar Elizabeth, who stated that pt's insurance does not cover OT and speech therapy that pt can o

## 2021-11-10 NOTE — TELEPHONE ENCOUNTER
Spoke with pt's  Opal Scott, verified pt's , informed of Dr Pam Oropeza message below,  verbalized understanding.

## 2021-11-10 NOTE — TELEPHONE ENCOUNTER
Pt's  called back stating he spoke with [facility] and was informed that all services are covered,  very upset with [address] and does not want to continue using their services. Dr Estefani Sood was informed of 's message verbally.  A

## 2021-11-10 NOTE — TELEPHONE ENCOUNTER
Spouse reports he had to contact patient's insurance provider for a list of [facility] agencies that can provide PT, OT, speech. He received a list of 5 agencies that are covered under her plan.  Please contact him to confirm when [facility] has been set up so he knows herminio

## 2021-11-11 NOTE — TELEPHONE ENCOUNTER
Zahraa Reid from [facility] called stating that pt has been approved for the following services: OT,PT, speech therapy, and nurse. Pt will be contacted by [facility] to set up care either today or tomorrow.  Spoke with pt's  Oscar Franklin and informed of above informa

## 2021-11-11 NOTE — TELEPHONE ENCOUNTER
Order dated 10/12/21 received, signed and faxed to [facility] ar [phone], confirmation received.

## 2021-11-12 NOTE — TELEPHONE ENCOUNTER
Refill passed per [employer] [place], [facility] protocol.     Requested Prescriptions   Pending Prescriptions Disp Refills    LEVETIRACETAM 250 MG Oral Tab [Pharmacy Med Name: LEVETIRACETAM 250 MG TABLET] 60 tablet 1     Sig: TAKE 1 TABLET BY MOUTH TWICE A DAY        Neurology Medications Passed - 11/12/2021 12:30 PM        Passed - Appointment in the past 6 or next 3 months                  Recent Outpatient Visits              3 weeks ago Seizure disorder [facility])    [place] [employer] [place], [facility], [address], Liban Mott MD    Office Visit    1 month ago Age-related nuclear cataract of both eyes    [facility] Matt Chiu MD    Office Visit    2 months ago Contact dermatitis of left upper and lower eyelids    [facility] [facility] Matt Chiu MD    Office Visit    3 months ago Acute iritis, left eye    [facility] Matt Chiu MD    Office Visit    4 months ago Acute iritis, left eye    [facility] Matt Chiu MD    Office Visit

## 2021-11-13 NOTE — TELEPHONE ENCOUNTER
On call    Spoke to ER doctor regarding the choice of the anticoagulation     Patient diagnosed with DVT leg     Agree with normal kidney function Xarelto can be used , as long as it is covered  Pt  insurance     It is  Recommended patient to follow-up wit

## 2021-11-13 NOTE — ED PROVIDER NOTES
Patient Seen in: [facility] AND [facility] Emergency Department    History   Patient presents with:  Swelling Edema    Stated Complaint: L leg swelling     HPI    60-year-old female with past medical history of Alzheimer's, seizure disorder, dyslipidemia, hyperten 95   • Diabetes Brother    • Heart Disease Brother         CAD   • Hypertension Brother    • Obesity Brother    • Glaucoma Neg    • Macular degeneration Neg        Social History    Tobacco Use      Smoking status: Never Smoker      Smokeless tobacco: Vaughn Izquierdo DOPPLER LEG LEFT-DIAG IMG (CPT=93971)  COMPARISON: None. INDICATIONS: Swelling left foot  TECHNIQUE: Color duplex Doppler venous ultrasound of the left lower extremity was performed in the usual manner.   FINDINGS: There is nonocclusive thrombus involving with primary care coverage. Patient without tachycardia/hypoxia, doubt pulmonary embolism; stable for discharge with ongoing anticoagulation and primary care follow-up, warning instructions for emergent return given to  with understanding voiced.

## 2021-11-13 NOTE — TELEPHONE ENCOUNTER
Called  and Dorcas's number provided to this nurse. Called and left message to Dorcas's number . Called patient's Mobile number but it  is her daughter's voice mail and left message.   Called Home number and spoke with  (WILLIAM), advised Dr Lian Pagan

## 2021-11-13 NOTE — TELEPHONE ENCOUNTER
My main concern is if she may have DVT since it's just one side, would recommend to have her go to ER for eval to rule out DVT.

## 2021-11-13 NOTE — TELEPHONE ENCOUNTER
Dr Alda Dennis:   Chelsi Birch of [facility] agency called. She admitted patient yesterday to [facility]. She reported patient will mild-moderate swelling of Left lower extremity/foot. No sob. No other symptoms.      If you like to order further st

## 2021-11-15 NOTE — TELEPHONE ENCOUNTER
Seen in ER on 11/13/21:       Clinical Impression:  Acute deep vein thrombosis (DVT) of popliteal vein of left lower extremity ([address]  (primary encounter diagnosis)     Disposition:  Discharge     Follow-up:  Gabino Rivers MD  [address] [place] [address]

## 2021-11-16 NOTE — TELEPHONE ENCOUNTER
Spoke with Eliza Nowak at [facility] informed that order for bedside commode has been faxed and order for hospital bed has been received and is awaiting provider's signature, Eliza Nowak verbalized understanding and requested order to get faxed to [phone].  Re faxe

## 2021-11-16 NOTE — TELEPHONE ENCOUNTER
Dottie Lugo from [address] [place] [address] called for an update on commode and hospital bed she states authorization was faxed to the wrong number. Dottie Lugo is asking if can fax it to [phone].  Dottie Lugo will be faxing an order for the hospital bed can you please call

## 2021-11-16 NOTE — TELEPHONE ENCOUNTER
Order for hospital bed signed and faxed to [address] [place] [address] at [phone], confirmation received.

## 2021-11-16 NOTE — TELEPHONE ENCOUNTER
Order for a commode from [facility] received, signed and faxed to [facility] at [phone], confirmation received.

## 2021-11-16 NOTE — TELEPHONE ENCOUNTER
Order for hospital bed received from [facility], placed on Dr Baldemar Manjarrez desk for review and signature.

## 2021-11-17 NOTE — TELEPHONE ENCOUNTER
Daughter Jackeline Navarrete, (on WILLIAM) calling regarding order for hospital bed. Advised of note below.  No further questions

## 2021-11-18 PROBLEM — R11.10 NON-INTRACTABLE VOMITING: Status: ACTIVE | Noted: 2021-01-01

## 2021-11-18 PROBLEM — R11.11 NON-INTRACTABLE VOMITING WITHOUT NAUSEA, UNSPECIFIED VOMITING TYPE: Status: ACTIVE | Noted: 2021-01-01

## 2021-11-18 NOTE — ED INITIAL ASSESSMENT (HPI)
Patient brought in by  for vomiting since last night, patient is a kathleen and non-verbal normally, recently dx with blood clot to left leg

## 2021-11-18 NOTE — TELEPHONE ENCOUNTER
Dr. Merly Rodrigues advised.  agreeable and will take patient now. Received call from [facility], Physical Therapist with [facility]. [phone]. Patient has thrown up 3 times since overnight. Has headache.      Physical Therapist of seizure medication if patient unable to keep food/fluid down. RN advised ER now.  and Meeru verbalizes understanding and is agreeable to instructions. RN offered to call 911/ambulance for them.     states he can drive patient and will u

## 2021-11-18 NOTE — ED QUICK NOTES
Assumed care of patient from triage via wheelchair, assistance x2 to get into bed.  Patient's  as historian for patient reporting that he was just newly diagnosed with a blood clot and started on new medication (Xarelto), and since then she has been

## 2021-11-19 NOTE — CM/SW NOTE
SW received MDO for advanced directives. Consult received for Hospice. Residential Hosp[facility] liaison aware/notified. SW met with patient and , Claritza Castro, and daughter at bedside. Patient's , Claritza Castro, confirmed address on facesheet.  Claritza Castro report

## 2021-11-19 NOTE — PROGRESS NOTES
Residential hospice received referral for patient. Meeting time with family pending.      Bob Shirley  Residential liaison  [phone]

## 2021-11-19 NOTE — TELEPHONE ENCOUNTER
ED to Hosp-Admission Current     11/18/2021 - present (1 day)  Moiz Wheatley MD    Last attending • Treatment team  Non-intractable vomiting without nausea, unspecified vomiting type    Principal problem

## 2021-11-19 NOTE — PHYSICAL THERAPY NOTE
PHYSICAL THERAPY EVALUATION - INPATIENT     Room Number: 525/525-A  Evaluation Date: 11/19/2021  Type of Evaluation: Initial   Physician Order: PT Eval and Treat    Presenting Problem: Vomiting  Reason for Therapy: Mobility Dysfunction and Discharge Plann activated at end of session with all needs in reach. Patient's current functional deficits include bed mobility, transfers, and gait.  Patient does have the physical skills to return to prior living environment upon D/C from the hospital with 24 hour assist unspecified hyperlipidemia    • Pregnancy     x 3   • Seizure disorder (HCC)    • TIA (transient ischemic attack)    • Unspecified essential hypertension      Past Surgical History  Past Surgical History:   Procedure Laterality Date   • FOOT SURGERY     • Another: Climbing 3-5 steps with a railing?: Total     AM-PAC Score:  Raw Score: 8   Approx Degree of Impairment: 86.62%   Standardized Score (AM-PAC Scale): 28.58   CMS Modifier (G-Code): CM    Exercise/Education Provided:  Bed mobility  Body mechanics
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
## 2021-11-19 NOTE — CONSULTS
[facility]   Gastroenterology Consultation Note    Lorraine Hanson  Patient Status:    Inpatient  Date of Admission:         11/18/2021, Hospital day #1  Attending:   Saurabh Lucero MD  PCP:     Palak Leonardo MD    Reason for Co 11/20/2021] ferrous sulfate EC tab 325 mg, 325 mg, Oral, Daily with breakfast  •  FLUoxetine (PROZAC) cap 10 mg, 10 mg, Oral, Daily  •  levETIRAcetam (KEPPRA) tab 250 mg, 250 mg, Oral, BID  •  rivaroxaban (XARELTO) tab 15 mg, 15 mg, Oral, BID  •  [START ON Unknown time  SIMVASTATIN 20 MG Oral Tab, TAKE 1 TABLET BY MOUTH EVERY DAY EVERY NIGHT, Disp: 90 tablet, Rfl: 1, 11/18/2021 at Unknown time        Review of Systems:  Unable to obtain 12 point review of systems secondary to altered mental status    Physica nodular focus suspicious for a reactive/metastatic node or omental implant. Trace pelvic ascites, most likely reactive. 2.  Oral transient intussusception is a within the ileum. This could be secondary to aforementioned colonic lesion and/or enteritis. diagnosis could be considered. But again it is not clear the benefit of this procedure if she would not otherwise be a candidate for treatment and management. D/w primary team. To sign off, please call with questions.     Hamida Boykin MD  [facility]

## 2021-11-19 NOTE — SLP NOTE
ADULT SWALLOWING EVALUATION    ASSESSMENT    ASSESSMENT/OVERALL IMPRESSION:    Proper PPE worn. Hands sanitized upon entrance/exit Pt room. This BSE was ordered d/t RN dysphagia screen.  Pt on \"softer food\"/thin liquids at home, with spouse (who fee possible pharyngeal dysfunction. Per [facility] Scale, Pt's swallow function is Level 4 of 7. Collaborated with RN regarding Pt's swallowing plan of care. Additional time spent during session with BSE results/recommendations discussed with Pt and family;  Pt w attack)    • Unspecified essential hypertension      Prior Living Situation: Home with spouse  Diet Prior to Admission:  (\"soft foods\"/thin liquids)  Precautions: Aspiration    Patient/Family Goals: to eat    SWALLOWING HISTORY  Current Diet Consistency: [address] [facility]  #[id number]

## 2021-11-19 NOTE — PROGRESS NOTES
[facility]     Hospitalist Progress Note     [facility]:  [facility]    10/28/1940 MRN [id number]   Location [facility] 5SW/SE Attending Michael Pires MD   Hosp Day # 1 PCP Myke Claudio MD     Chief 145 150*   K 4.0 3.5    117*   CO2 25.0 26.0   ALKPHO 89 70   AST 24 21   ALT 18 13   BILT 0.7 0.5   TP 7.3 5.5*       Estimated Creatinine Clearance: 39.4 mL/min (based on SCr of 0.86 mg/dL).     No results for input(s): PTP, INR in the last 168 hour Dictated by (CST): Evans Fontaine MD on 11/18/2021 at 6:43 PM     Finalized by (CST): Evans Fontaine MD on 11/18/2021 at 6:53 PM                  Medications:     Assessment & Plan:        Non-intractable vomiting and nausea  -Patient presenting with vomiting. bedside.     Quality:  · DVT Prophylaxis: Xarelto  · CODE status: Full  Estimated date of discharge: TBD  Discharge is dependent on: clinical stability      Greater than 36 minutes spent, >50% spent coordinating care with providers and counseling re: treatm

## 2021-11-19 NOTE — H&P
[facility]    PATIENT'S NAME: Holden Current   ATTENDING PHYSICIAN: Blanquita Faith MD   PATIENT ACCOUNT#:   [de-identified]    LOCATION:  [place] [address]  MEDICAL RECORD #:   [id number]       YOB: 1940  ADMISSION DATE:       11/18 surgery. MEDICATIONS:  Please see medication reconciliation list.    FAMILY HISTORY:  Mother had dementia and cancer. Father had heart disease. SOCIAL HISTORY:  Bedbound. Lives with her  who is the primary caretaker.   Requires assistance in candidate for any form of treatment considering her poor functional status and possibly she is hospice care appropriate at this point. Place her on aspiration precautions. Further recommendations to follow.     Dictated By Elizabet Quigley MD  d: 11/18/202

## 2021-11-19 NOTE — PLAN OF CARE
Problem: Patient Centered Care  Goal: Patient preferences are identified and integrated in the patient's plan of care  Description: Interventions:  - What would you like us to know as we care for you?  From home with .  - Provide timely, complete, Nasogastric tube to low intermittent suction as ordered  - Evaluate effectiveness of ordered antiemetic medications  - Provide nonpharmacologic comfort measures as appropriate  - Advance diet as tolerated, if ordered  - Obtain nutritional consult as needed

## 2021-11-19 NOTE — ED PROVIDER NOTES
Patient Seen in: [facility] AND [facility] Emergency Department      History   Patient presents with:  Vomiting    Stated Complaint: vomitting    Subjective:   HPI  Patient is a 21-year-old female history of Alzheimer's, nonverbal, GERD, seizure disorder, hypert [11/18/21 1630]   BP (!) 131/104   Pulse 115   Resp 20   Temp 97 °F (36.1 °C)   Temp src    SpO2 93 %   O2 Device None (Room air)       Current:BP (!) 138/108   Pulse 101   Temp 97 °F (36.1 °C)   Resp 21   Wt 59.4 kg   SpO2 94%   BMI 24.75 kg/m²         Ph Albumin 2.9 (*)     All other components within normal limits   CBC W/ DIFFERENTIAL - Abnormal; Notable for the following components:    RBC 6.03 (*)     HGB 17.3 (*)     HCT 53.7 (*)     RDW-SD 46.8 (*)     All other components within normal limits   CBC 6:43 PM     Finalized by (CST): Chito Izaguirre MD on 11/18/2021 at 6:53 PM                        MDM       Patient is a 50-year-old female history of Alzheimer's, nonverbal, GERD, seizure disorder, hypertension presenting from home for vomiting.     No acute

## 2021-11-19 NOTE — ED QUICK NOTES
Orders for admission, patient is aware of plan and ready to go upstairs. Any questions, please call ED RN Megan Noriega  at extension [phone].    Type of COVID test sent: rapid- low      Titratable drug(s) infusing:  Rate: none    LOC at time of transport: AOx1, dem

## 2021-11-20 NOTE — PLAN OF CARE
No complaints of pain. Frequent rounding and repositioning. IVF. Family at bedside. Poor appetite. Bed in lowest position and safety precautions in place.    Problem: Patient Centered Care  Goal: Patient preferences are identified and integrated in the wendie level of mobility/gait  Description: Interventions:  - Assess patient's functional ability and stability  - Promote increasing activity/tolerance for mobility and gait  - Educate and engage patient/family in tolerated activity level and precautions  Outcom

## 2021-11-20 NOTE — PROGRESS NOTES
[place] [facility] - [facility]     Hospitalist Progress Note     Grady Larry Patient Status:  Inpatient    10/28/1940 MRN [id number]   Location [facility] 5SW/SE Attending Tomasa Lewis MD   Hosp Day # 2 PCP Malcom Yoon MD     Chief 368.0 274.0 246.0       Recent Labs   Lab 11/18/21  1710 11/19/21  0509 11/20/21  0456   * 98 87   BUN 20* 16 10   CREATSERUM 1.30* 0.86 0.64   GFRAA 45* 74 97   GFRNAA 39* 64 85   CA 10.0 8.3* 7.8*   ALB 2.9* 2.4*  --     150* 149*   K 4.0 3. left lower lobar pulmonary artery, suspicious for pulmonary embolus. All pulmonary arteries are not visualized on this abdomen/pelvis CT. Left lower extremity DVT was demonstrated on recent 11/13/21 Doppler venous ultrasound exam. 4.  Hepatic cysts.  5. workup    John Jennings MD        This note was prepared using [employer] voice recognition dictation software. As a result errors may occur. When identified these errors have been corrected.  While every attempt is made to correct errors during [facility]

## 2021-11-21 NOTE — PLAN OF CARE
Paz placed. Family at bedside. Discharge education complete and IV removed by this RN. All belongings gathered and all questions answered. Discharged to home with family and [facility]. Patient's daughter, Zelda Vivas, updated on ETA.     Problem: Leanna with strengthening/mobility  - Encourage toileting schedule  Outcome: Adequate for Discharge     Problem: GASTROINTESTINAL - ADULT  Goal: Minimal or absence of nausea and vomiting  Description: INTERVENTIONS:  - Maintain adequate hydration with IV or PO as

## 2021-11-21 NOTE — DISCHARGE SUMMARY
[facility]    Discharge Summary    Jazmín Dimas Patient Status:  Inpatient    10/28/1940 MRN [id number]   Location [facility] 5SW/SE Attending Moishe Nageotte, MD   Hosp Day # 3 PCP Shiraz Edge MD     Date of Admissi to aforementioned colonic lesion and/or enteritis. No evidence of small-bowel obstruction. Filling defect within the left lower pulmonary artery suspicious for pulmonary embolism.   Pulmonary arteries are not visualized at this exam.\"    Hospital Course: 2318  Gross per 24 hour   Intake 1980 ml   Output 1000 ml   Net 980 ml     GENERAL:  Awake and alert, in no acute distress. HEENT: Normocephalic. Extraocular muscles were intact. PERRL. NECK:  Supple.  Trach midline  CHEST:  Symmetrical movement on insp suspicious for a reactive/metastatic node or omental implant. Trace pelvic ascites, most likely reactive. 2.  Oral transient intussusception is a within the ileum. This could be secondary to aforementioned colonic lesion and/or enteritis.   No evidence of > 150*  --  149*  --  146*   K 4.0   < > 3.5   < > 2.9* 3.5 3.2*      < > 117*  --  118*  --  117*   CO2 25.0   < > 26.0  --  25.0  --  25.0   ALKPHO 89  --  70  --   --   --   --    AST 24  --  21  --   --   --   --    ALT 18  --  13  --   --   -- [address]  [phone]    Schedule an appointment as soon as possible for a visit  If symptoms worsen    Froilan Chacko MD    Consultants  Chat With All Active Members    Provider Role Specialty    Angie Jeroniom MD  Consulting Physician  Malu Schofield

## 2021-11-21 NOTE — SLP NOTE
SPEECH DAILY NOTE - INPATIENT    ASSESSMENT & PLAN   ASSESSMENT  PPE: DROPLET MASK AND GLOVES. HAND SANITIZED UPON ENTRANCE/EXIT. Dysphagia service: Pt upright at bedside. Accepted PO trials pudding and thin liquid x1 each, provided 1:1 feed. . Pt demo palpating. SLP eventually assist pt to remove from oral cavity (scooped as pt would not expel). Coughing due to increased secretions with oral holding of bolus.     In Progress   Goal #2 The family/caregiver/staff will utilize compensatory strategies as out

## 2021-11-21 NOTE — CM/SW NOTE
BENNY rec'd call from RN stating family has secured care with [facility] and pt will dc today. RN requested amb transport. CM placed amb on will call and completed pcs form. [facility] to [facility].  BENNY extended deadline, sen

## 2021-11-21 NOTE — HOSPICE RN NOTE
[facility] follow up on hospice consult. Had appointment tomorrow morning at 10am to meet with family. Daughter Dorota Polk called this RN to cancel hospice meeting.  Family member works for [employer] and they want this patient to go [place]

## 2021-11-21 NOTE — PLAN OF CARE
Problem: Patient Centered Care  Goal: Patient preferences are identified and integrated in the patient's plan of care  Description: Interventions:  - What would you like us to know as we care for you?  From home with .  - Provide timely, complete, an Nasogastric tube to low intermittent suction as ordered  - Evaluate effectiveness of ordered antiemetic medications  - Provide nonpharmacologic comfort measures as appropriate  - Advance diet as tolerated, if ordered  - Obtain nutritional consult as needed

## 2021-11-22 ENCOUNTER — TELEPHONE (OUTPATIENT)
Dept: INTERNAL MEDICINE CLINIC | Facility: CLINIC | Age: 81
End: 2021-11-22

## 2021-11-22 NOTE — PAYOR COMM NOTE
--------------  ADMISSION REVIEW     Payor: Karen Keyes [place] #:  [id number]  Authorization Number: [id number]    Admit date: 11/18/21  Admit time: 10:32 PM       REVIEW DOCUMENTATION:     ED Provider Notes      ED Provider Notes signed stated complaint: vomitting  Other systems are as noted in HPI. Constitutional and vital signs reviewed. All other systems reviewed and negative except as noted above.     Physical Exam     ED Triage Vitals [11/18/21 1630]   BP (!) 131/104   Pulse 115 Osmolality 306 (*)     GFR, Non- 39 (*)     GFR, -American 45 (*)     All other components within normal limits   HEPATIC FUNCTION PANEL (7) - Abnormal; Notable for the following components:    Albumin 2.9 (*)     All other component hiatal hernia.   Dictated by (CST): David Mcdonough MD on 11/18/2021 at 6:43 PM     Finalized by (CST): David Mcdonough MD on 11/18/2021 at 6:53 PM                       MDM       Patient is a 52-year-old female history of Alzheimer's, nonverbal, GERD, seizure di YOB: 1940  ADMISSION DATE:       11/18/2021    HISTORY AND PHYSICAL EXAMINATION    CHIEF COMPLAINT:  Nausea, vomiting, hepatic flexure colon mass highly suggestive of colon malignancy.     HISTORY OF PRESENT ILLNESS:  Patient is an 81-year Able to follow very simple commands. Other than that, she is slightly drowsy. VITAL SIGNS:  Temperature 97, pulse 110, respiratory rate 21, blood pressure 138/108, pulse ox 94% on room air. HEENT:  Atraumatic. Oropharynx clear. Dry mucous membranes. 78 [phone] - 11/18/21 1825 Accession number: [id number]   Resulting lab: [facility] IMAGING Result details   Narrative:   PROCEDURE: CT ABDOMEN PELVIS IV CONTRAST NO ORAL (ER)       COMPARISON: [address] [place] [address] LEFT-DIA IM appendix are normal.  No evidence of small bowel obstruction.  No organized measurable fluid collection.  Trace   ascites in the central pelvis and right pericolic gutter.  Scattered telescoping segments of mid-distal ileum in the central pelvis without anisha pulmonary embolus.  All pulmonary arteries are not visualized on this abdomen/pelvis CT.  Left lower extremity DVT was demonstrated on recent 11/13/21 Doppler venous   ultrasound exam.   4.  Hepatic cysts.    5.  Small hiatal hernia.       Dictated by (CST) Recent Labs   Lab 11/18/21  1710 11/19/21  0509   * 98   BUN 20* 16   CREATSERUM 1.30* 0.86   GFRAA 45* 74   GFRNAA 39* 64   CA 10.0 8.3*   ALB 2.9* 2.4*    150*   K 4.0 3.5    117*   CO2 25.0 26.0   ALKPHO 89 70   AST 24 21   ALT 18 1 are not visualized on this abdomen/pelvis CT. Left lower extremity DVT was demonstrated on recent 11/13/21 Doppler venous ultrasound exam. 4.  Hepatic cysts. 5.  Small hiatal hernia.   Dictated by (CST): Kostas Mendoza MD on 11/18/2021 at 6:43 PM     Sydnee Alcala but has been deferring until official decision made with hospice care.  wishes to continue full code at this time.   ACP time 35 min        Plan of care discussed with patient and family at bedside.     Quality:  · DVT Prophylaxis: Xarelto  · CODE s distress. HEENT: Normocephalic. Extraocular muscles were intact. PERRL. NECK:  Supple. Trach midline  CHEST:  Symmetrical movement on inspiration  HEART:  S1 and S2 heard. RRR   LUNGS:  Air entry was minimally decreased.   No crackles or wheezes   ABDO Date: 11/18/2021  CONCLUSION:  1. Within the hepatic flexure there is a 4 cm narrowed segment/stricture with acute appearing inflammation. There is an adjacent soft tissue focus measuring approximately 3 x 4 cm.   Findings are suspicious for colonic malig discussion, declining further aggressive interventions at this time.  -Hospice care consulted, follow-up official decisions     TRISHA   -Resolving  -Likely secondary to decreased p.o. intake and vomiting as above  -Continue IVF  - Avoiding Nephrotoxic agents with a history of Alzheimer dementia, bedbound, requires assistance in her basic acclivities of daily living, was seen in the emergency room on November 13 for leg swelling on the left side.  She had a venous Doppler study which showed distal femoral and p p.o. intake and vomiting as above     DVT/PE  -Recently diagnosed  -Possible PE noted on CT as above  -Continue Xarelto per family wishes     Seizure disorder  -Last seizure several weeks prior  -Continue Keppra per family wishes     After several discussi 11/13/2021 at 2:59 PM     Finalized by (CST): Elie Oakley MD on 11/13/2021 at 3:01 PM           XR FOOT (2 VIEW), LEFT (CPT=73620)     Result Date: 11/13/2021  CONCLUSION:  1. No acute findings.     Dictated by (CST): Maciel Boykin MD 0.48 (L) 11/21/2021     BUN 7 11/21/2021      (H) 11/21/2021     K 3.2 (L) 11/21/2021      (H) 11/21/2021     CO2 25.0 11/21/2021     GLU 78 11/21/2021     CA 8.0 (L) 11/21/2021     ALB 2.4 (L) 11/19/2021     ALKPHO 70 11/19/2021     BILT 0.5 1 tablet  Refills: 1                    CONTINUE taking these medications      Instructions Prescription details   Centrum Silver Tabs       Take 1 tablet by mouth daily.    Refills: 0      Ferrous Sulfate 325 (65 Fe) MG Tabs       Take 1 tablet (325 mg tota mg     Date Action Dose Route User    Discharged on 11/21/2021 11/21/2021 0934 Given 10 mg Oral Rashmi Agee, RN      levETIRAcetam (KEPPRA) tab 250 mg     Date Action Dose Route User    Discharged on 11/21/2021 11/21/2021 0934 Given 250 mg Oral Ro °F (36.6 °C) 91 16 129/81 98 % — None (Room air) — AR    11/20/21 0605 97.9 °F (36.6 °C) — 16 120/77 96 % — None (Room air) — MA

## 2021-11-22 NOTE — TELEPHONE ENCOUNTER
Patient's daughter, Katie Valenzuela, is calling to inform Dr. Sommer Zuluaga that patient passed away last night 11/21/2021.

## 2021-11-24 NOTE — TELEPHONE ENCOUNTER
Order number [id number] signed and faxed to [address] [place] [address] at [phone], confirmation received.

## 2021-11-24 NOTE — TELEPHONE ENCOUNTER
Order number [id number] received from [facility], placed on Dr Jose Juan White desk for review and signature.

## 2021-11-29 ENCOUNTER — TELEPHONE (OUTPATIENT)
Dept: INTERNAL MEDICINE CLINIC | Facility: CLINIC | Age: 81
End: 2021-11-29

## 2021-11-29 NOTE — TELEPHONE ENCOUNTER
Philomena from home health will be faxing over orders for patient that were pending. Needs signature and face to face notes for home health. Call with any questions.

## 2021-11-30 NOTE — TELEPHONE ENCOUNTER
All orders signed and faxed to [address] [place] [address] at [phone], confirmation received.

## 2021-11-30 NOTE — TELEPHONE ENCOUNTER
Order numbers [id number], [id number], [id number], and [id number] received from [facility], placed on Dr Baldemar Manjarrez desk for review and signature.

## 2021-12-06 ENCOUNTER — TELEPHONE (OUTPATIENT)
Dept: INTERNAL MEDICINE CLINIC | Facility: CLINIC | Age: 81
End: 2021-12-06

## 2021-12-06 NOTE — TELEPHONE ENCOUNTER
Lauryn from [facility]  called to ask if office received her request for patients records sent on 12/03/21, she also states she did not receive records on 11/30/21, that where sent by office to please re fax.

## 2021-12-08 NOTE — TELEPHONE ENCOUNTER
Spoke with Russell Johnson from [facility], informed that documents were faxed on 11/30/21 and nothing has been received for 12/3/21, she will fax documents again, verified fax number. Also re faxed previous documents again to [phone], confirmation received.

## 2021-12-08 NOTE — TELEPHONE ENCOUNTER
Order number [id number] signed and faxed to [address] [place] [address] at [phone], confirmation received.

## 2021-12-08 NOTE — TELEPHONE ENCOUNTER
Order number [id number] received from [facility], placed on Dr Shaun salazar for review and signature.

## 2021-12-14 NOTE — TELEPHONE ENCOUNTER
Discharge summary from [facility] dated 11/11/2021 received, placed on Dr Manuel Acosta desk for review and signature.

## 2022-01-10 ENCOUNTER — TELEPHONE (OUTPATIENT)
Dept: INTERNAL MEDICINE CLINIC | Facility: CLINIC | Age: 82
End: 2022-01-10

## 2022-01-10 NOTE — TELEPHONE ENCOUNTER
[facility] stating that they sent documents over on 01/07/2022 that  needs to sign and they are calling to confirm if they were received. Please advise.

## 2022-01-11 NOTE — TELEPHONE ENCOUNTER
Missed visit form from [place] received, signed and faxed to [phone], confirmation received.

## 2022-03-08 ENCOUNTER — TELEPHONE (OUTPATIENT)
Dept: INTERNAL MEDICINE CLINIC | Facility: CLINIC | Age: 82
End: 2022-03-08

## 2022-03-08 NOTE — TELEPHONE ENCOUNTER
Discharge from Agency order dated 11/11/21 and Admission Order dated 10/12/21 received and placed in Dr. Faizan Gupta in basket to complete.

## 2022-03-08 NOTE — TELEPHONE ENCOUNTER
Discharge from agency order dated 11/11/2021 and Admission Order dated 10/12/21 received and placed in Dr. Lopez Dress in basket to complete.

## 2022-03-10 NOTE — TELEPHONE ENCOUNTER
Discharge order dated 11/11/2021 and admission order dated 10/12/2021 signed and faxed to [facility] ar [phone], confirmation received.

## 2022-08-26 NOTE — TELEPHONE ENCOUNTER
Pt spouse calling to state pt she was set up with holter monitor yesterday, but took off some of the leads. Per spouse pt has alzheimer's and doesn't remember she is not supposed to detach them. Please advise. Show Topical Anesthesia Variable?: Yes Medical Necessity Clause: This procedure was medically necessary because the lesions that were treated were: Add 52 Modifier (Optional): no Detail Level: Simple Consent: The patient's consent was obtained including but not limited to risks of crusting, scabbing, blistering, scarring, darker or lighter pigmentary change, recurrence, incomplete removal and infection. Duration Of Freeze Thaw-Cycle (Seconds): 10-15 Post-Care Instructions: I reviewed with the patient in detail post-care instructions. Patient is to wear sunprotection, and avoid picking at any of the treated lesions. Pt may apply Vaseline to crusted or scabbing areas. Medical Necessity Information: It is in your best interest to select a reason for this procedure from the list below. All of these items fulfill various CMS LCD requirements except the new and changing color options. Number Of Freeze-Thaw Cycles: 2 freeze-thaw cycles Spray Paint Text: The liquid nitrogen was applied to the skin utilizing a spray paint frosting technique.

## 2023-12-07 NOTE — TELEPHONE ENCOUNTER
Medication Refill Request    Date of phone call: 12/6/2023    Medication being requested: oxycodone 10 mg sig: take 1 tab daily prn  Qty: 30    Date of last visit: 11/15/2023    Date of last refill: 10/13/2023    SMITH up to date?: yes    Next Follow up?: 1/15/2023    Any new pertinent information? (i.e, new medication allergies, new use of medications, change in patient's health or condition, non-compliance or inconsistency with prescribing agreement?):    Reyna Jones from [facility] called requesting Additional speech therapy verbal orders

## (undated) NOTE — MR AVS SNAPSHOT
[address]  [address], [address]  [phone]               Thank you for choosing us for your health care visit with RIMMA Perez MD.  We are glad to serve you and happy to provide you with this summary of Take 1 tablet (20 mg total) by mouth once daily. Commonly known as:  PRINIVIL,ZESTRIL           SIMPLY SLEEP OR   Take  by mouth.  Prn for sleep.           simvastatin 20 MG Tabs   TAKE ONE TABLET BY MOUTH NIGHTLY   Commonly known as:  ZOCOR

## (undated) NOTE — LETTER
[address], [address], [address] [facility], [address] [place] [address], [address]  . [address]  [phone]        Dear Ebonie Patel,      I had the pleasure of seeing your patient, Yonathan Howard on 8/23/2018.      Below please find a summ

## (undated) NOTE — LETTER
Date: 11/9//2017     Dx:  Abnormal gait (R26.9)         Authorized # of Visits:  12       Referring MD: Gómez More  Next MD visit: none scheduled    Medication Changes since last visit?: No    Subjective: Patient states that she is feeling good and she has been d Goal status G Code: Mobility: Walking and Moving Around CK: 40-59% impaired, limited, or restricted      Plan:   Discharge from physical therapy with [facility].

## (undated) NOTE — LETTER
October 31, 2019    Naif [address]ner [address]     Patient: Ilya Alvaradoi   YOB: 1940   Date of Visit: 10/31/2019       Dear Dr. Dani Sol MD:    Thank you for referring Herrera Brenda to me for evaluation.  Here is Smokeless tobacco: Never Used    Alcohol use:  Yes      Alcohol/week: 0.0 standard drinks      Comment: wine daily    Drug use: No      Medications:  prednisoLONE acetate 1 % Ophthalmic Suspension, Use 1 drop 4 times a day in the left eye, Disp: 1 [facility] Left +1.50 +1.75 170 +2.50    Type:  Progressive bifocal                 ASSESSMENT/PLAN:     Diagnoses and Plan:     Acute iritis, left eye  Discussed diagnosis and treatment in detail with patient. Stop Tobramycin drops in both eyes.    Start Pred fort

## (undated) NOTE — LETTER
Date: 3/7/2019     Dx: Gait abnormality (R26.9)          Authorized # of Visits:  12 + 12 = 24      Referring MD: Audrey Marking  Next MD visit: none scheduled    Medication Changes since last visit?: No  Subjective: Patient is feeling good and does not have any rep

## (undated) NOTE — LETTER
September 15, 2021    Franko Elder MD  [address]  [address] [facility]     Patient: Omaira Flores   YOB: 1940   Date of Visit: 9/15/2021       Dear Dr. Juan Steward MD:    Thank you for referring Eleni Jade to [facility]   Medication Sig Dispense Refill   • propranolol 10 MG Oral Tab Take 1 tablet (10 mg total) by mouth daily.  90 tablet 1   • Ferrous Sulfate 325 (65 Fe) MG Oral Tab Take 1 tablet (325 mg total) by mouth daily with breakfast. 90 tablet Asteroid hyalosis          Fundus Exam       Right Left    Disc Good rim Good rim    C/D Ratio 0.45 0.45    Macula few soft and some fine hard drusen  few soft and some fine hard drusen     Vessels Normal Normal    Periphery Normal Normal            Refrac orders of the defined types were placed in this encounter.       Meds This Visit:  Requested Prescriptions      No prescriptions requested or ordered in this encounter        Follow up instructions:  Return in about 1 year (around 9/15/2022) for Complete ey

## (undated) NOTE — LETTER
[address]  [address], [address] [facility]  [phone]        Dear Darrell Farias MD,      I had the pleasure of seeing your patient, Lulu Li on 8/1/2019. Below please find a summary of our visit.

## (undated) NOTE — LETTER
Brittany Gauthier. [facility] [address] [address]        Dear Beth Vernon: On behalf of your primary doctor RIMMA Nino MD, we have made multiple attempts to reach you by phone over the past several weeks.   Unfortunately, we have not h

## (undated) NOTE — LETTER
[address], [address], [address] [facility], [address] [place] [address], [address]  . [address]  [phone]        Dear Clinton Vincent,      I had the pleasure of seeing your patient, Liz Ramirez on 1/2/2019.      Below please find a summa

## (undated) NOTE — LETTER
19          Abbe Claire  :  10/28/1940      Attention Uzair in Medical Records at [facility]-    This patient was seen in our office on 19 .       Can you please fax reports of the following to  as soon as possible so we can contin

## (undated) NOTE — MR AVS SNAPSHOT
[address]  [address], [address]  [address] [facility]  [phone]               Thank you for choosing us for your health care visit with RIMMA Meng MD.  We are glad to serve you and happy to provide you with this summar BuPROPion HCl ER (SR) 100 MG Tb12   Take 1 tablet by mouth daily. Commonly known as:  WELLBUTRIN SR           FLUoxetine HCl 20 MG Caps   Take 20 mg by mouth daily.    Commonly known as:  PROZAC           hydrochlorothiazide 25 MG Tabs   Take 1 tablet (2 EAT THESE FOODS MORE OFTEN: EAT THESE FOODS LESS OFTEN:   Make half your plate fruits and vegetables Highly refined, white starches including white bread, rice and pasta   Eat plenty of protein, keep the fat content low Sugars:  sodas and sports drinks, ca

## (undated) NOTE — LETTER
04/29/21        [address] [place] [address]      Dear Katie Mdeina,    [address] [facility] records indicate that you have outstanding lab work and or testing that was ordered for you and has not yet been completed:        Vitamin Z37      Folic Acid

## (undated) NOTE — IP AVS SNAPSHOT
[facility]            (For Outpatient Use Only) Initial Admit Date: 11/18/2021   Inpt/Obs Admit Date: Inpt: 11/18/21 / Obs: N/A   Discharge Date:    Cami Zapata:  [de-identified]   MRN: [de-identified]   CSN: [id number]   CEID: [id number]        E Number:  Insurance Type:    Subscriber Name:  Subscriber :    Subscriber ID:  Pt Rel to Subscriber:    Hospital Account Financial Class: Medicare Advantage    2021

## (undated) NOTE — IP AVS SNAPSHOT
Patient Demographics     Address  [address] [id number] Phone  [phone] (Home) *Preferred*  [phone] [facility]) E-mail Address  [email or web address]      Emergency Contact(s)     Name Relation Home Work Saul Mujica Directed based on package instructions: Days 1-21: 15 mg by mouth twice daily Days 22-30: 20 mg by mouth once daily   Izabel Lieberman MD         simvastatin 20 MG Tabs  Commonly known as: ZOCOR  Next dose due:  Tonight 9pm      TAKE 1 TABLET BY MOUTH AIME [facility])   Sodium 146 136 - 145 mmol/L [facility])   Potassium 3.2 3.5 - 5.1 mmol/L [facility])   Chloride 117 98 - 112 mmol/L [facility] ([facility])   CO2 25.0 21.0 - 32.0 mmol/L [facility] ([facility])   Anion Gap 4 0 - 18 mmol/L [facility] ([facility]) Elizabeth Kaiser. Noe Clemons M.D. [facility]. [address]  [facility] [address] 03/19/20 1442 - Present            Microbiology Results (All)     Procedure Component Value Units Date/Time    Rapid SARS-CoV-2 by PCR [[id number]]  (Normal) extension. Within the central abdominal mesentery, there is also 8 x 10 mm nodular focus suspicious for reactive versus metastatic node or omental implant. Oral transient intussusception is within the ilium.   This could be secondary to aforementioned col defect. ASSESSMENT AND PLAN:    1.   Nausea, vomiting with findings on imaging study suggestive of hepatic flexure stricture narrowing suggestive of malignancy with localized extension and possible metastatic focus in the omentum.   There is also finding GERD, who presents with n/v and poor appetite. No family in room at time of visit. She denies n/v, abdominal pain, constipation, diarrhea. No overt gi bleeding.      History:  Past Medical History:   Diagnosis Date   • Alzheimer's dementia ([facility] [address]    • Edd mg, Oral, Q6H PRN  •  ondansetron (ZOFRAN) injection 4 mg, 4 mg, Intravenous, Q6H PRN  •  OLANZapine (ZYPREXA) 5 mg in Sterile Water for Injection IM injection, 5 mg, Intramuscular, Q12H PRN  •  morphINE sulfate (PF) 2 MG/ML injection 1 mg, 1 mg, Intraveno is 24.75 kg/m².     General: awake, alert and oriented x0, no acute distress  HEENT: moist mucus membranes  PULM: no conversational dyspnea  CARDIOVASCULAR: regular rate and rhythm, the extremities are warm and well perfused  GI: soft, non-tender, non-diste visualized on this abdomen/pelvis CT. Left lower extremity DVT was demonstrated on recent 11/13/21 Doppler venous ultrasound exam. 4.  Hepatic cysts. 5.  Small hiatal hernia.   Dictated by (CST): Ulysses Malay, MD on 11/18/2021 at 6:43 PM     Finalized by PATRICIA these errors have been corrected.  While every attempt is made to correct errors during dictation, discrepancies may still exist.       Electronically signed by Pamella Ag MD on 11/19/2021  4:10 PM           D/C Summary    No notes of this type exist f possibly returning home with hospice, if family chooses hospice, patient will still need the above mentioned equipment.  Patient's  would also benefit from caregiver training on equipment and body mechanics to be able to protect himself while caring Fair  Frequency (Obs): 3-5x/week       PHYSICAL THERAPY MEDICAL/SOCIAL HISTORY   Problem List  Principal Problem:    Non-intractable vomiting without nausea, unspecified vomiting type  Active Problems:    Non-intractable vomiting    Past Medical History  P INPATIENT SHORT FORM - BASIC MOBILITY  How much difficulty does the patient currently have. ..   Patient Difficulty: Turning over in bed (including adjusting bedclothes, sheets and blankets)?: A Lot   Patient Difficulty: Sitting down on and standing up from Author: Ann Milligan OT Service: — Author Type: Occupational Therapist    Filed: 11/19/2021  5:59 PM Date of Service: 11/19/2021  2:10 PM Status: Signed    : Shara Nielsen (Occupational Therapist)       OCCUPATIONAL THERAPY EVALUATION - I significant decline in level of function since October 10th when she had a seizure. Prior to this event, patient was ambulatory without an assistive device and able to complete ADLs without assist. Patient has since been unable to ambulate.  Patient lives w Date   • Alzheimer's dementia [facility])    • Bunion    • Contact dermatitis of left upper and lower eyelids 9/2/2021   • Depression     medication and therapy   • Esophageal reflux    • Floater, vitreous, bilateral 12/12/2019   • Hammer toe    • Lipid screenin using toilet, bedpan or urinal? : A Lot  -   Putting on and taking off regular upper body clothing?: A Lot  -   Taking care of personal grooming such as brushing teeth?: A Little  -   Eating meals?: A Little    AM-PAC Score:  Score: 13  Approx Degree of Im Dysphagia service: Pt upright at bedside. Accepted PO trials pudding and thin liquid x1 each, provided 1:1 feed. . Pt demo oral holding of tsp pudding, despite max cueing to swallow, tactile cue, spoon pressure to posterior lingual surface and extensive oral holding of bolus.     In Progress   Goal #2 The family/caregiver/staff will utilize compensatory strategies as outlined by  BSSE (clinical evaluation) including Slow rate, Alternate liquids/solids, Pinched straw, Upright 90 degrees, cue Pt to swallow goals for specific interventions